# Patient Record
Sex: MALE | Race: WHITE | NOT HISPANIC OR LATINO | Employment: UNEMPLOYED | URBAN - METROPOLITAN AREA
[De-identification: names, ages, dates, MRNs, and addresses within clinical notes are randomized per-mention and may not be internally consistent; named-entity substitution may affect disease eponyms.]

---

## 2018-02-27 ENCOUNTER — OFFICE VISIT CONVERTED (OUTPATIENT)
Dept: FAMILY MEDICINE CLINIC | Facility: CLINIC | Age: 33
End: 2018-02-27
Attending: NURSE PRACTITIONER

## 2018-08-30 ENCOUNTER — OFFICE VISIT CONVERTED (OUTPATIENT)
Dept: FAMILY MEDICINE CLINIC | Facility: CLINIC | Age: 33
End: 2018-08-30
Attending: NURSE PRACTITIONER

## 2019-03-05 ENCOUNTER — CONVERSION ENCOUNTER (OUTPATIENT)
Dept: FAMILY MEDICINE CLINIC | Facility: CLINIC | Age: 34
End: 2019-03-05

## 2019-03-05 ENCOUNTER — OFFICE VISIT CONVERTED (OUTPATIENT)
Dept: FAMILY MEDICINE CLINIC | Facility: CLINIC | Age: 34
End: 2019-03-05
Attending: NURSE PRACTITIONER

## 2019-09-17 ENCOUNTER — OFFICE VISIT CONVERTED (OUTPATIENT)
Dept: FAMILY MEDICINE CLINIC | Facility: CLINIC | Age: 34
End: 2019-09-17
Attending: NURSE PRACTITIONER

## 2019-11-25 ENCOUNTER — HOSPITAL ENCOUNTER (OUTPATIENT)
Dept: URGENT CARE | Facility: CLINIC | Age: 34
Discharge: HOME | End: 2019-11-25

## 2020-06-03 ENCOUNTER — OFFICE VISIT CONVERTED (OUTPATIENT)
Dept: FAMILY MEDICINE CLINIC | Facility: CLINIC | Age: 35
End: 2020-06-03
Attending: NURSE PRACTITIONER

## 2020-06-03 ENCOUNTER — HOSPITAL ENCOUNTER (OUTPATIENT)
Dept: FAMILY MEDICINE CLINIC | Facility: CLINIC | Age: 35
Discharge: HOME | End: 2020-06-03
Attending: NURSE PRACTITIONER

## 2020-06-03 LAB
ALBUMIN SERPL-MCNC: 4.6 G/DL (ref 3.5–5)
ALBUMIN/GLOB SERPL: 1.8 {RATIO} (ref 1.4–2.6)
ALP SERPL-CCNC: 55 U/L (ref 53–128)
ALT SERPL-CCNC: 16 U/L (ref 10–40)
ANION GAP SERPL CALC-SCNC: 14 MMOL/L (ref 8–19)
APPEARANCE UR: CLEAR
AST SERPL-CCNC: 25 U/L (ref 15–50)
BASOPHILS # BLD AUTO: 0.11 10*3/UL (ref 0–0.2)
BASOPHILS NFR BLD AUTO: 1.5 % (ref 0–3)
BILIRUB SERPL-MCNC: 0.25 MG/DL (ref 0.2–1.3)
BILIRUB UR QL: NEGATIVE
BUN SERPL-MCNC: 10 MG/DL (ref 5–25)
BUN/CREAT SERPL: 9 {RATIO} (ref 6–20)
CALCIUM SERPL-MCNC: 9.6 MG/DL (ref 8.7–10.4)
CHLORIDE SERPL-SCNC: 105 MMOL/L (ref 99–111)
CHOLEST SERPL-MCNC: 155 MG/DL (ref 107–200)
CHOLEST/HDLC SERPL: 3.9 {RATIO} (ref 3–6)
COLOR UR: YELLOW
CONV ABS IMM GRAN: 0.01 10*3/UL (ref 0–0.2)
CONV CO2: 25 MMOL/L (ref 22–32)
CONV COLLECTION SOURCE (UA): NORMAL
CONV IMMATURE GRAN: 0.1 % (ref 0–1.8)
CONV TOTAL PROTEIN: 7.2 G/DL (ref 6.3–8.2)
CONV UROBILINOGEN IN URINE BY AUTOMATED TEST STRIP: 0.2 {EHRLICHU}/DL (ref 0.1–1)
CREAT UR-MCNC: 1.12 MG/DL (ref 0.7–1.2)
DEPRECATED RDW RBC AUTO: 41.1 FL (ref 35.1–43.9)
EOSINOPHIL # BLD AUTO: 0.28 10*3/UL (ref 0–0.7)
EOSINOPHIL # BLD AUTO: 3.9 % (ref 0–7)
ERYTHROCYTE [DISTWIDTH] IN BLOOD BY AUTOMATED COUNT: 11.9 % (ref 11.6–14.4)
GFR SERPLBLD BASED ON 1.73 SQ M-ARVRAT: >60 ML/MIN/{1.73_M2}
GLOBULIN UR ELPH-MCNC: 2.6 G/DL (ref 2–3.5)
GLUCOSE SERPL-MCNC: 83 MG/DL (ref 70–99)
GLUCOSE UR QL: NEGATIVE MG/DL
HCT VFR BLD AUTO: 43.7 % (ref 42–52)
HDLC SERPL-MCNC: 40 MG/DL (ref 40–60)
HGB BLD-MCNC: 14.6 G/DL (ref 14–18)
HGB UR QL STRIP: NEGATIVE
KETONES UR QL STRIP: NEGATIVE MG/DL
LDLC SERPL CALC-MCNC: 97 MG/DL (ref 70–100)
LEUKOCYTE ESTERASE UR QL STRIP: NEGATIVE
LYMPHOCYTES # BLD AUTO: 1.63 10*3/UL (ref 1–5)
LYMPHOCYTES NFR BLD AUTO: 22.6 % (ref 20–45)
MCH RBC QN AUTO: 31.3 PG (ref 27–31)
MCHC RBC AUTO-ENTMCNC: 33.4 G/DL (ref 33–37)
MCV RBC AUTO: 93.6 FL (ref 80–96)
MONOCYTES # BLD AUTO: 0.58 10*3/UL (ref 0.2–1.2)
MONOCYTES NFR BLD AUTO: 8 % (ref 3–10)
NEUTROPHILS # BLD AUTO: 4.6 10*3/UL (ref 2–8)
NEUTROPHILS NFR BLD AUTO: 63.9 % (ref 30–85)
NITRITE UR QL STRIP: NEGATIVE
NRBC CBCN: 0 % (ref 0–0.7)
OSMOLALITY SERPL CALC.SUM OF ELEC: 288 MOSM/KG (ref 273–304)
PH UR STRIP.AUTO: 6.5 [PH] (ref 5–8)
PLATELET # BLD AUTO: 283 10*3/UL (ref 130–400)
PMV BLD AUTO: 10 FL (ref 9.4–12.4)
POTASSIUM SERPL-SCNC: 4.3 MMOL/L (ref 3.5–5.3)
PROT UR QL: NEGATIVE MG/DL
RBC # BLD AUTO: 4.67 10*6/UL (ref 4.7–6.1)
SODIUM SERPL-SCNC: 140 MMOL/L (ref 135–147)
SP GR UR: 1.01 (ref 1–1.03)
TRIGL SERPL-MCNC: 92 MG/DL (ref 40–150)
VLDLC SERPL-MCNC: 18 MG/DL (ref 5–37)
WBC # BLD AUTO: 7.21 10*3/UL (ref 4.8–10.8)

## 2020-11-25 ENCOUNTER — OFFICE VISIT CONVERTED (OUTPATIENT)
Dept: FAMILY MEDICINE CLINIC | Facility: CLINIC | Age: 35
End: 2020-11-25
Attending: FAMILY MEDICINE

## 2020-11-25 ENCOUNTER — HOSPITAL ENCOUNTER (OUTPATIENT)
Dept: FAMILY MEDICINE CLINIC | Facility: CLINIC | Age: 35
Discharge: HOME | End: 2020-11-25
Attending: FAMILY MEDICINE

## 2020-11-25 LAB
ALBUMIN SERPL-MCNC: 4.5 G/DL (ref 3.5–5)
ALBUMIN/GLOB SERPL: 1.5 {RATIO} (ref 1.4–2.6)
ALP SERPL-CCNC: 62 U/L (ref 53–128)
ALT SERPL-CCNC: 18 U/L (ref 10–40)
ANION GAP SERPL CALC-SCNC: 17 MMOL/L (ref 8–19)
AST SERPL-CCNC: 27 U/L (ref 15–50)
BASOPHILS # BLD AUTO: 0.11 10*3/UL (ref 0–0.2)
BASOPHILS NFR BLD AUTO: 1.3 % (ref 0–3)
BILIRUB SERPL-MCNC: 0.24 MG/DL (ref 0.2–1.3)
BUN SERPL-MCNC: 12 MG/DL (ref 5–25)
BUN/CREAT SERPL: 9 {RATIO} (ref 6–20)
CALCIUM SERPL-MCNC: 10 MG/DL (ref 8.7–10.4)
CHLORIDE SERPL-SCNC: 103 MMOL/L (ref 99–111)
CHOLEST SERPL-MCNC: 169 MG/DL (ref 107–200)
CHOLEST/HDLC SERPL: 4.2 {RATIO} (ref 3–6)
CONV ABS IMM GRAN: 0.01 10*3/UL (ref 0–0.2)
CONV CO2: 25 MMOL/L (ref 22–32)
CONV IMMATURE GRAN: 0.1 % (ref 0–1.8)
CONV TOTAL PROTEIN: 7.5 G/DL (ref 6.3–8.2)
CREAT UR-MCNC: 1.27 MG/DL (ref 0.7–1.2)
DEPRECATED RDW RBC AUTO: 39.9 FL (ref 35.1–43.9)
EOSINOPHIL # BLD AUTO: 0.24 10*3/UL (ref 0–0.7)
EOSINOPHIL # BLD AUTO: 2.8 % (ref 0–7)
ERYTHROCYTE [DISTWIDTH] IN BLOOD BY AUTOMATED COUNT: 11.8 % (ref 11.6–14.4)
GFR SERPLBLD BASED ON 1.73 SQ M-ARVRAT: >60 ML/MIN/{1.73_M2}
GLOBULIN UR ELPH-MCNC: 3 G/DL (ref 2–3.5)
GLUCOSE SERPL-MCNC: 84 MG/DL (ref 70–99)
HCT VFR BLD AUTO: 42 % (ref 42–52)
HDLC SERPL-MCNC: 40 MG/DL (ref 40–60)
HGB BLD-MCNC: 14.3 G/DL (ref 14–18)
LDLC SERPL CALC-MCNC: 105 MG/DL (ref 70–100)
LYMPHOCYTES # BLD AUTO: 2.19 10*3/UL (ref 1–5)
LYMPHOCYTES NFR BLD AUTO: 25.6 % (ref 20–45)
MCH RBC QN AUTO: 31.6 PG (ref 27–31)
MCHC RBC AUTO-ENTMCNC: 34 G/DL (ref 33–37)
MCV RBC AUTO: 92.9 FL (ref 80–96)
MONOCYTES # BLD AUTO: 0.82 10*3/UL (ref 0.2–1.2)
MONOCYTES NFR BLD AUTO: 9.6 % (ref 3–10)
NEUTROPHILS # BLD AUTO: 5.19 10*3/UL (ref 2–8)
NEUTROPHILS NFR BLD AUTO: 60.6 % (ref 30–85)
NRBC CBCN: 0 % (ref 0–0.7)
OSMOLALITY SERPL CALC.SUM OF ELEC: 289 MOSM/KG (ref 273–304)
PLATELET # BLD AUTO: 286 10*3/UL (ref 130–400)
PMV BLD AUTO: 10.2 FL (ref 9.4–12.4)
POTASSIUM SERPL-SCNC: 4.6 MMOL/L (ref 3.5–5.3)
RBC # BLD AUTO: 4.52 10*6/UL (ref 4.7–6.1)
SODIUM SERPL-SCNC: 140 MMOL/L (ref 135–147)
T4 FREE SERPL-MCNC: 1.1 NG/DL (ref 0.9–1.8)
TRIGL SERPL-MCNC: 121 MG/DL (ref 40–150)
TROPONIN T SERPL-MCNC: <0.01 NG/ML (ref 0–0.1)
TSH SERPL-ACNC: 1.48 M[IU]/L (ref 0.27–4.2)
VLDLC SERPL-MCNC: 24 MG/DL (ref 5–37)
WBC # BLD AUTO: 8.56 10*3/UL (ref 4.8–10.8)

## 2020-12-02 ENCOUNTER — OFFICE VISIT CONVERTED (OUTPATIENT)
Dept: FAMILY MEDICINE CLINIC | Facility: CLINIC | Age: 35
End: 2020-12-02
Attending: FAMILY MEDICINE

## 2020-12-02 ENCOUNTER — HOSPITAL ENCOUNTER (OUTPATIENT)
Dept: FAMILY MEDICINE CLINIC | Facility: CLINIC | Age: 35
Discharge: HOME | End: 2020-12-02
Attending: FAMILY MEDICINE

## 2020-12-02 LAB
ANION GAP SERPL CALC-SCNC: 13 MMOL/L (ref 8–19)
BUN SERPL-MCNC: 22 MG/DL (ref 5–25)
BUN/CREAT SERPL: 15 {RATIO} (ref 6–20)
CALCIUM SERPL-MCNC: 9.7 MG/DL (ref 8.7–10.4)
CHLORIDE SERPL-SCNC: 101 MMOL/L (ref 99–111)
CONV CO2: 29 MMOL/L (ref 22–32)
CREAT UR-MCNC: 1.47 MG/DL (ref 0.7–1.2)
GFR SERPLBLD BASED ON 1.73 SQ M-ARVRAT: >60 ML/MIN/{1.73_M2}
GLUCOSE SERPL-MCNC: 75 MG/DL (ref 70–99)
OSMOLALITY SERPL CALC.SUM OF ELEC: 290 MOSM/KG (ref 273–304)
POTASSIUM SERPL-SCNC: 3.8 MMOL/L (ref 3.5–5.3)
SODIUM SERPL-SCNC: 139 MMOL/L (ref 135–147)

## 2021-03-12 ENCOUNTER — OFFICE VISIT CONVERTED (OUTPATIENT)
Dept: FAMILY MEDICINE CLINIC | Facility: CLINIC | Age: 36
End: 2021-03-12
Attending: FAMILY MEDICINE

## 2021-03-12 ENCOUNTER — HOSPITAL ENCOUNTER (OUTPATIENT)
Dept: FAMILY MEDICINE CLINIC | Facility: CLINIC | Age: 36
Discharge: HOME | End: 2021-03-12
Attending: FAMILY MEDICINE

## 2021-03-12 LAB
ALBUMIN SERPL-MCNC: 4.7 G/DL (ref 3.5–5)
ALBUMIN/GLOB SERPL: 1.5 {RATIO} (ref 1.4–2.6)
ALP SERPL-CCNC: 58 U/L (ref 53–128)
ALT SERPL-CCNC: 14 U/L (ref 10–40)
ANION GAP SERPL CALC-SCNC: 20 MMOL/L (ref 8–19)
AST SERPL-CCNC: 17 U/L (ref 15–50)
BASOPHILS # BLD AUTO: 0.08 10*3/UL (ref 0–0.2)
BASOPHILS NFR BLD AUTO: 1.1 % (ref 0–3)
BILIRUB SERPL-MCNC: 0.22 MG/DL (ref 0.2–1.3)
BUN SERPL-MCNC: 62 MG/DL (ref 5–25)
BUN/CREAT SERPL: 14 {RATIO} (ref 6–20)
CALCIUM SERPL-MCNC: 9.6 MG/DL (ref 8.7–10.4)
CHLORIDE SERPL-SCNC: 98 MMOL/L (ref 99–111)
CONV ABS IMM GRAN: 0.03 10*3/UL (ref 0–0.2)
CONV CO2: 18 MMOL/L (ref 22–32)
CONV IMMATURE GRAN: 0.4 % (ref 0–1.8)
CONV TOTAL PROTEIN: 7.8 G/DL (ref 6.3–8.2)
CREAT UR-MCNC: 4.28 MG/DL (ref 0.7–1.2)
DEPRECATED RDW RBC AUTO: 36.8 FL (ref 35.1–43.9)
EOSINOPHIL # BLD AUTO: 0.29 10*3/UL (ref 0–0.7)
EOSINOPHIL # BLD AUTO: 3.9 % (ref 0–7)
ERYTHROCYTE [DISTWIDTH] IN BLOOD BY AUTOMATED COUNT: 11.6 % (ref 11.6–14.4)
GFR SERPLBLD BASED ON 1.73 SQ M-ARVRAT: 16 ML/MIN/{1.73_M2}
GLOBULIN UR ELPH-MCNC: 3.1 G/DL (ref 2–3.5)
GLUCOSE SERPL-MCNC: 97 MG/DL (ref 70–99)
HCT VFR BLD AUTO: 30.2 % (ref 42–52)
HGB BLD-MCNC: 10.4 G/DL (ref 14–18)
LYMPHOCYTES # BLD AUTO: 1.78 10*3/UL (ref 1–5)
LYMPHOCYTES NFR BLD AUTO: 23.7 % (ref 20–45)
MCH RBC QN AUTO: 30 PG (ref 27–31)
MCHC RBC AUTO-ENTMCNC: 34.4 G/DL (ref 33–37)
MCV RBC AUTO: 87 FL (ref 80–96)
MONOCYTES # BLD AUTO: 0.64 10*3/UL (ref 0.2–1.2)
MONOCYTES NFR BLD AUTO: 8.5 % (ref 3–10)
NEUTROPHILS # BLD AUTO: 4.69 10*3/UL (ref 2–8)
NEUTROPHILS NFR BLD AUTO: 62.4 % (ref 30–85)
NRBC CBCN: 0 % (ref 0–0.7)
OSMOLALITY SERPL CALC.SUM OF ELEC: 290 MOSM/KG (ref 273–304)
PLATELET # BLD AUTO: 372 10*3/UL (ref 130–400)
PMV BLD AUTO: 9.4 FL (ref 9.4–12.4)
POTASSIUM SERPL-SCNC: 5 MMOL/L (ref 3.5–5.3)
RBC # BLD AUTO: 3.47 10*6/UL (ref 4.7–6.1)
SODIUM SERPL-SCNC: 131 MMOL/L (ref 135–147)
WBC # BLD AUTO: 7.51 10*3/UL (ref 4.8–10.8)

## 2021-03-15 ENCOUNTER — HOSPITAL ENCOUNTER (OUTPATIENT)
Dept: OTHER | Facility: HOSPITAL | Age: 36
Discharge: HOME | End: 2021-03-15
Attending: UROLOGY

## 2021-03-18 ENCOUNTER — OFFICE VISIT CONVERTED (OUTPATIENT)
Dept: UROLOGY | Facility: CLINIC | Age: 36
End: 2021-03-18
Attending: UROLOGY

## 2021-03-18 ENCOUNTER — CONVERSION ENCOUNTER (OUTPATIENT)
Dept: SURGERY | Facility: CLINIC | Age: 36
End: 2021-03-18

## 2021-03-19 ENCOUNTER — HOSPITAL ENCOUNTER (OUTPATIENT)
Dept: PERIOP | Facility: HOSPITAL | Age: 36
Setting detail: HOSPITAL OUTPATIENT SURGERY
Discharge: HOME | End: 2021-03-19
Attending: UROLOGY

## 2021-03-19 LAB
ANION GAP SERPL CALC-SCNC: 16 MMOL/L (ref 8–19)
BASOPHILS # BLD AUTO: 0.12 10*3/UL (ref 0–0.2)
BASOPHILS NFR BLD AUTO: 1.6 % (ref 0–3)
BUN SERPL-MCNC: 46 MG/DL (ref 5–25)
BUN/CREAT SERPL: 14 {RATIO} (ref 6–20)
CALCIUM SERPL-MCNC: 9.7 MG/DL (ref 8.7–10.4)
CHLORIDE SERPL-SCNC: 103 MMOL/L (ref 99–111)
CONV ABS IMM GRAN: 0.01 10*3/UL (ref 0–0.2)
CONV CO2: 22 MMOL/L (ref 22–32)
CONV IMMATURE GRAN: 0.1 % (ref 0–1.8)
CREAT UR-MCNC: 3.3 MG/DL (ref 0.7–1.2)
DEPRECATED RDW RBC AUTO: 38 FL (ref 35.1–43.9)
EOSINOPHIL # BLD AUTO: 0.24 10*3/UL (ref 0–0.7)
EOSINOPHIL # BLD AUTO: 3.3 % (ref 0–7)
ERYTHROCYTE [DISTWIDTH] IN BLOOD BY AUTOMATED COUNT: 11.8 % (ref 11.6–14.4)
GFR SERPLBLD BASED ON 1.73 SQ M-ARVRAT: 23 ML/MIN/{1.73_M2}
GLUCOSE SERPL-MCNC: 98 MG/DL (ref 70–99)
HCT VFR BLD AUTO: 25.9 % (ref 42–52)
HGB BLD-MCNC: 9.1 G/DL (ref 14–18)
LYMPHOCYTES # BLD AUTO: 1.76 10*3/UL (ref 1–5)
LYMPHOCYTES NFR BLD AUTO: 24.1 % (ref 20–45)
MCH RBC QN AUTO: 30.8 PG (ref 27–31)
MCHC RBC AUTO-ENTMCNC: 35.1 G/DL (ref 33–37)
MCV RBC AUTO: 87.8 FL (ref 80–96)
MONOCYTES # BLD AUTO: 0.57 10*3/UL (ref 0.2–1.2)
MONOCYTES NFR BLD AUTO: 7.8 % (ref 3–10)
NEUTROPHILS # BLD AUTO: 4.61 10*3/UL (ref 2–8)
NEUTROPHILS NFR BLD AUTO: 63.1 % (ref 30–85)
NRBC CBCN: 0 % (ref 0–0.7)
OSMOLALITY SERPL CALC.SUM OF ELEC: 294 MOSM/KG (ref 273–304)
PLATELET # BLD AUTO: 326 10*3/UL (ref 130–400)
PMV BLD AUTO: 9.2 FL (ref 9.4–12.4)
POTASSIUM SERPL-SCNC: 4.7 MMOL/L (ref 3.5–5.3)
RBC # BLD AUTO: 2.95 10*6/UL (ref 4.7–6.1)
SODIUM SERPL-SCNC: 136 MMOL/L (ref 135–147)
WBC # BLD AUTO: 7.31 10*3/UL (ref 4.8–10.8)

## 2021-04-01 ENCOUNTER — HOSPITAL ENCOUNTER (OUTPATIENT)
Dept: OTHER | Facility: HOSPITAL | Age: 36
Discharge: HOME | End: 2021-04-01
Attending: UROLOGY

## 2021-04-01 ENCOUNTER — OFFICE VISIT CONVERTED (OUTPATIENT)
Dept: UROLOGY | Facility: CLINIC | Age: 36
End: 2021-04-01
Attending: UROLOGY

## 2021-04-01 LAB
ALBUMIN SERPL-MCNC: 4.2 G/DL (ref 3.5–5)
ALBUMIN/GLOB SERPL: 1.4 {RATIO} (ref 1.4–2.6)
ALP SERPL-CCNC: 71 U/L (ref 53–128)
ALT SERPL-CCNC: 25 U/L (ref 10–40)
ANION GAP SERPL CALC-SCNC: 16 MMOL/L (ref 8–19)
AST SERPL-CCNC: 23 U/L (ref 15–50)
BILIRUB SERPL-MCNC: <0.15 MG/DL (ref 0.2–1.3)
BUN SERPL-MCNC: 25 MG/DL (ref 5–25)
BUN/CREAT SERPL: 8 {RATIO} (ref 6–20)
CALCIUM SERPL-MCNC: 9.7 MG/DL (ref 8.7–10.4)
CHLORIDE SERPL-SCNC: 104 MMOL/L (ref 99–111)
CONV CO2: 25 MMOL/L (ref 22–32)
CONV TOTAL PROTEIN: 7.3 G/DL (ref 6.3–8.2)
CREAT UR-MCNC: 3.05 MG/DL (ref 0.7–1.2)
GFR SERPLBLD BASED ON 1.73 SQ M-ARVRAT: 25 ML/MIN/{1.73_M2}
GLOBULIN UR ELPH-MCNC: 3.1 G/DL (ref 2–3.5)
GLUCOSE SERPL-MCNC: 98 MG/DL (ref 70–99)
OSMOLALITY SERPL CALC.SUM OF ELEC: 294 MOSM/KG (ref 273–304)
POTASSIUM SERPL-SCNC: 4.5 MMOL/L (ref 3.5–5.3)
SODIUM SERPL-SCNC: 140 MMOL/L (ref 135–147)

## 2021-04-14 ENCOUNTER — CONVERSION ENCOUNTER (OUTPATIENT)
Dept: SURGERY | Facility: CLINIC | Age: 36
End: 2021-04-14

## 2021-05-07 NOTE — PROGRESS NOTES
Progress Note      Patient Name: Rick Weems   Patient ID: [id number]   Sex: Male   YOB: 1985        Visit Date: March 12, 2021    Provider: Garfield Viveros MD   Location: [facility]   Location Address: [address] [place] [address] [place]  [address]   Location Phone: [phone]          Chief Complaint  · Follow up office visit within 7 calender days of discharge from inpatient status (high complexity).      History Of Present Illness  FOLLOW UP OFFICE VISIT WITHIN 7 CALENDER DAYS OF INPATIENT STATUS (SEVERE COMPLEXITY)  Rick Weems presents to office for follow up post discharge from inpatient status within 7 calender days. Patient was contacted within 2 business days via phone conversation. Documentation of that phone contact is present in the patient's electronic chart. Patient was admitted to an inpatient faciliity on 03/05/2021 and discharged on 03/08/2021 due to: ACUTE KIDNEY FAILURE   Admitting MD: JOEY TREVINO   His discharge summary has been reviewed and placed in the patient's electronic chart.   Patient's problem list is: Anxiety, Hypertension, Benign Essential, and Seasonal allergies   Patient's medication list has been updated/reconcilled from discharge summary. Medication list is: acetaminophen 500 mg oral tablet, Florastor 250 mg oral capsule, hydrocodone-acetaminophen 5-325 mg oral tablet, levofloxacin 500 mg oral tablet, Miralax 17 gram/dose oral powder, and tamsulosin 0.4 mg oral capsule      pt says that he is doing better since being in hospital for prostatitis.  Pt will be seeing urology and nephrology in 1 week.  Pt will get CT scan next week.  Pt still has nails.  HGM on discharge was 8.7.  Pt has had no further symptoms.   Rick Weems is a 35 year old /White male who presents for evaluation and treatment of:       Past Medical History  Disease Name Date Onset Notes   Anxiety --  --    Hypertension, Benign Essential --  --    Seasonal  allergies --  --          Past Surgical History  Procedure Name Date Notes   *Denies any surgical procedures --  --          Medication List  Name Date Started Instructions   acetaminophen 500 mg oral tablet 03/08/2021 take 2 tablets (1,000 mg) by oral route every 8 hours as needed   Florastor 250 mg oral capsule 03/08/2021 take 1 capsule by oral route 2 times a day   hydrocodone-acetaminophen 5-325 mg oral tablet 03/08/2021 take 1 tablet by oral route every 6 hours as needed for pain   levofloxacin 500 mg oral tablet 03/10/2021 take 1 tablet (500 mg) by oral route every other day starting on 03/10   Miralax 17 gram/dose oral powder 03/10/2021 take 17 gram mixed with 8 oz. water, juice, soda, coffee or tea by oral route once daily   tamsulosin 0.4 mg oral capsule 03/08/2021 take 1 capsule (0.4 mg) by oral route once daily 1/2 hour following the same meal each day         Allergy List  Allergen Name Date Reaction Notes   NO KNOWN DRUG ALLERGIES --  --  --          Family Medical History  Disease Name Relative/Age Notes   Chronic Obstructive Pulmonary Disease Father/   Father   DM Type II Father/   Father   Hypertension Father/  Mother/   Father; Mother   Cerebrovascular Accident (CVA) Grandfather (maternal)/   Grandfather (maternal)         Social History  Finding Status Start/Stop Quantity Notes   Alcohol Never --/-- --  never drinks alcohol   Exercises regularly --  --/-- --  3-4 times per week   Recreational Drug Use Never --/-- --  never used   Second hand smoke exposure Unknown --/-- --  yes   Tobacco Former --/-- 0.5 PPD former smoker, smokes less than 1 pack per day, for 5 years, never uses other tobacco products   Uses seatbelts --  --/-- --  yes         Immunizations  NameDate Admin Mfg Trade Name Lot Number Route Inj VIS Given VIS Publication   Ucwzoupvl73/27/2018 SKB FLUVIRIN [id number] IM RD 02/27/2018 08/07/2015   Comments: [id number]         Review of Systems  · Constitutional  o Denies  o : fever,  "weight gain, weight loss, malaise/fatigue  · Cardiovascular  o Denies  o : palpitation, chest pain, claudication, lower extremity edema  · Respiratory  o Denies  o : shortness of breath, hemoptysis, wheezing, productive cough  · Gastrointestinal  o Denies  o : nausea, vomiting, diarrhea, constipation, abdominal pain  · Neurologic  o Denies  o : unsteady gait, weakness, dizziness, H/A      Vitals  Date Time BP Position Site L\R Cuff Size HR RR TEMP (F) WT  HT  BMI kg/m2 BSA m2 O2 Sat FR L/min FiO2 HC       03/12/2021 12:47 /90 Sitting    100 - R  97.5 146lbs 16oz 5'  9\" 21.71 1.8 92 %  21%          Physical Examination  · Constitutional  o Appearance  o : well developed, well-nourished, in no acute distress  · Respiratory  o Respiratory Effort  o : breathing unlabored  o Auscultation of Lungs  o : clear to ascultation  · Cardiovascular  o Heart  o :   § Auscultation of Heart  § : regular rate and rhythm  o Peripheral Vascular System  o :   § Extremities  § : no edema  · Gastrointestinal  o Abdomen  o : soft, non-tender, non-distended, + bowel sounds, no hepatosplenomegaly, no masses palpated  · Musculoskeletal  o General  o :   § General Musculoskeletal  § : No joint swelling or deformity., Muscle tone, strength, and development grossly normal.  · Neurologic  o Gait and Station  o :   § Gait Screening  § : normal gait  · Psychiatric  o Mood and Affect  o : mood normal, affect appropriate          Assessment  · Anemia     285.9/D64.9  · Essential hypertension     401.9/I10      Plan  · Orders  o Discharge medications reconciled with the current medication list (1111F) - - 03/12/2021  o CBC with Auto Diff [facility] ([id number]) - 285.9/D64.9, 401.9/I10 - 03/12/2021  o CMP [facility] ([id number]) - 285.9/D64.9, 401.9/I10 - 03/12/2021  o ACO-39: Current medications updated and reviewed (1159F, ) - - 03/12/2021  · Medications  o Medications have been Reconciled  o Transition of Care or Provider Policy  · Instructions  o Patient " discharge summary has been reviewed and placed in patient's electronic medical record.  o Patient received a phone call from my office within 2 business days of discharge from inpatient status, and documented within the patient's chart.  o Also patient was seen (face to face) for follow up evaluation within 7 calender days of discharge from inpatient status for a high complexity issue.  o Patient was educated on their diagnosis, treatment and any medication changes while being evaluated today.  o Patient was educated/instructed on their diagnosis, treatment and medications prior to discharge from the clinic today.            Electronically Signed by: Garfield Viveros MD -Author on March 12, 2021 01:00:14 PM

## 2021-05-07 NOTE — PROGRESS NOTES
Progress Note      Patient Name: Rick Weems   Patient ID: [id number]   Sex: Male   YOB: 1985        Visit Date: February 27, 2018    Provider: KWAME Carrasco   Location: [facility]   Location Address: [address] [place] [address]  [id number]   Location Phone: [phone]          Chief Complaint     refill       History Of Present Illness  Rick Weems is a 32 year old /White male who presents for evaluation and treatment of:      HTN: stable - checks his BP at home and usually low 120's/ 80 - tries to follow a low salt diet, eats whatever he wants.     Has anxiety and his BP will go up slightly when in stressful/new situations.       Past Medical History  Disease Name Date Onset Notes   Anxiety --  --    Hypertension, Benign Essential --  --    Seasonal allergies --  --          Past Surgical History  Procedure Name Date Notes   *Denies any surgical procedures --  --          Medication List  Name Date Started Instructions   metoprolol succinate 50 mg oral tablet extended release 24 hr 11/27/2017 TAKE ONE TABLET BY MOUTH DAILY         Allergy List  Allergen Name Date Reaction Notes   NO KNOWN DRUG ALLERGIES --  --  --          Family Medical History  Disease Name Relative/Age Notes   Cerebrovascular Accident (CVA) / Grandfather (maternal)    Grandfather (maternal)/    Chronic Obstructive Pulmonary Disease / Father    Father/    DM Type II / Father    Father/    Hypertension / Father; Mother    Father/     Mother/          Social History  Finding Status Start/Stop Quantity Notes   Alcohol Never --/-- --  never drinks alcohol   Exercises regularly --  --/-- --  3-4 times per week   Recreational Drug Use Never --/-- --  never used   Second hand smoke exposure Unknown --/-- --  yes   Tobacco Former --/-- 0.5 PPD former smoker, smokes less than 1 pack per day, for 5 years, never uses other tobacco products   Uses seatbelts --  --/-- --  yes         Review  "of Systems  · Constitutional  o Denies  o : headache  · Cardiovascular  o Denies  o : chest pain, palpitations  · Respiratory  o Denies  o : shortness of breath, wheezing, cough      Vitals  Date Time BP Position Site L\R Cuff Size HR RR TEMP(F) WT  HT  BMI kg/m2 BSA m2 O2 Sat        02/27/2018 01:26 /80 Sitting    73 - R  97.6 155lbs 6oz 5'  10\" 22.29 1.87 99 %           Physical Examination  · Constitutional  o Appearance  o : well developed, well-nourished, in no acute distress  · Eyes  o Conjunctivae  o : conjunctivae normal  o Pupils and Irises  o : pupils equal and round, pupils reactive to light bilaterally  · Neck  o Inspection/Palpation  o : supple  o Thyroid  o : no thyromegaly  · Respiratory  o Respiratory Effort  o : breathing unlabored  o Auscultation of Lungs  o : clear to ascultation  · Cardiovascular  o Heart  o :   § Auscultation of Heart  § : regular rate and rhythm  o Peripheral Vascular System  o :   § Extremities  § : no edema  · Lymphatic  o Neck  o : no lymphadenopathy present  · Musculoskeletal  o General  o :   § General Musculoskeletal  § : Muscle tone, strength, and development grossly normal.  · Skin and Subcutaneous Tissue  o General Inspection  o : no lesions present, no areas of discoloration, skin turgor normal, texture normal  · Neurologic  o Gait and Station  o :   § Gait Screening  § : normal gait  · Psychiatric  o Mood and Affect  o : anxiety, affect appropriate          Assessment  · Need for influenza vaccination     V04.81/Z23      Plan  · Orders  o Flucelvax Quadrivalent Syringes [facility] ([id number]) - V04.81/Z23 - 02/27/2018   Vaccine - Influenza; Dose: 0.5; Site: Right Deltoid; Route: Intramuscular; Date: 02/27/2018 13:57:00; Exp: 05/28/2018; Lot: [id number]; Mfg: Quantified Skin; TradeName: Fluarix Quadrivalent; Administered By: Bety Munson MA; Comment: [id number]  o ACO-39: Current medications updated and reviewed () - - 02/27/2018  o ACO-18: Negative screen for " clinical depression using a standardized tool () - - 02/27/2018   verbal PHQ-2  o Influenza immunization was ordered or administered () - V04.81/Z23 - 02/27/2018  · Medications  o metoprolol succinate 50 mg oral tablet extended release 24 hr   SIG: TAKE ONE TABLET BY MOUTH DAILY   DISP: (30) Tablet with 5 refills  Adjusted on 02/27/2018     · Instructions  o Take all medications as prescribed/directed.  o Patient was educated/instructed on their diagnosis, treatment and medications prior to discharge from the clinic today.  o Follow up for next visit fasting for labs.   · Disposition  o Follow up as needed.            Electronically Signed by: KWAME Carrasco -Author on February 27, 2018 02:17:44 PM

## 2021-05-07 NOTE — PROGRESS NOTES
Progress Note      Patient Name: Rick Weems   Patient ID: [id number]   Sex: Male   YOB: 1985        Visit Date: November 25, 2020    Provider: Garfield Viveros MD   [facility]   Location Address: [address] [place] [address] [address] [place]  [address]   Location Phone: [phone]          Chief Complaint     Blood pressure staying elevated.       History Of Present Illness  Rick Weems is a 35 year old /White male who presents for evaluation and treatment of:      pt has had elevating BP over last few weeks- no headaches, no vision changes  HTN- uncontrolled  pt has no h/o of arrhythmia of palpitations or fast heartbeat- his previous doctor  pt had chest pains that lasted about a minute last night- no chest pains today- chest pains were sharp in center of chest    xrays:NAD  EKG:NSR, no ST or T wave changes       Past Medical History  Disease Name Date Onset Notes   Anxiety --  --    Hypertension, Benign Essential --  --    Seasonal allergies --  --          Past Surgical History  Procedure Name Date Notes   *Denies any surgical procedures --  --          Allergy List  Allergen Name Date Reaction Notes   NO KNOWN DRUG ALLERGIES --  --  --          Family Medical History  Disease Name Relative/Age Notes   Chronic Obstructive Pulmonary Disease Father/   Father   DM Type II Father/   Father   Hypertension Father/  Mother/   Father; Mother   Cerebrovascular Accident (CVA) Grandfather (maternal)/   Grandfather (maternal)         Social History  Finding Status Start/Stop Quantity Notes   Alcohol Never --/-- --  never drinks alcohol   Exercises regularly --  --/-- --  3-4 times per week   Recreational Drug Use Never --/-- --  never used   Second hand smoke exposure Unknown --/-- --  yes   Tobacco Former --/-- 0.5 PPD former smoker, smokes less than 1 pack per day, for 5 years, never uses other tobacco products   Uses seatbelts --  --/-- --  yes          Immunizations  NameDate Admin Mfg Trade Name Lot Number Route Inj VIS Given VIS Publication   [id number]/27/2018 SKB FLUVIRIN [id number] IM RD 02/27/2018 08/07/2015   Comments: [id number]         Review of Systems  · Constitutional  o Denies  o : fatigue, fever  · Eyes  o Denies  o : double vision, impaired vision, blurred vision, changes in vision  · HENT  o Denies  o : headaches  · Cardiovascular  o Admits  o : chest pain  o Denies  o : syncope, swelling (feet, ankles, hands), palpitations  · Respiratory  o Denies  o : shortness of breath, cough  · Gastrointestinal  o Denies  o : nausea, vomiting, diarrhea  · Psychiatric  o Denies  o : anxiety, depression      Vitals  Date Time BP Position Site L\R Cuff Size HR RR TEMP (F) WT  HT  BMI kg/m2 BSA m2 O2 Sat FR L/min FiO2        11/25/2020 12:40 /100 Sitting    88 - R  98 161lbs 0oz    99 %            Physical Examination  · Constitutional  o Appearance  o : well developed, well-nourished, in no acute distress  · Respiratory  o Respiratory Effort  o : breathing unlabored  o Auscultation of Lungs  o : clear to ascultation  · Cardiovascular  o Heart  o :   § Auscultation of Heart  § : regular rate and rhythm  o Peripheral Vascular System  o :   § Extremities  § : no edema  · Gastrointestinal  o Abdomen  o : soft, non-tender, non-distended, + bowel sounds, no hepatosplenomegaly, no masses palpated  · Musculoskeletal  o General  o :   § General Musculoskeletal  § : No joint swelling or deformity., Muscle tone, strength, and development grossly normal.  · Neurologic  o Gait and Station  o :   § Gait Screening  § : normal gait  · Psychiatric  o Mood and Affect  o : mood normal, affect appropriate              Assessment  · Chest pain     786.50/R07.9  · Essential hypertension     401.9/I10      Plan  · Orders  o CBC with Auto Diff [facility] ([id number]) - 401.9/I10 - 11/25/2020  o CMP [facility] ([id number]) - 401.9/I10 - 11/25/2020  o Lipid Panel [facility] ([id number]) - 401.9/I10 -  11/25/2020  o Thyroid Profile (THYII) - 401.9/I10 - 11/25/2020  o ACO-39: Current medications updated and reviewed (1159F, ) - - 11/25/2020  o Troponin (77269) - 401.9/I10, 786.50/R07.9 - 11/25/2020  o EKG (Recording and Interpretation) [facility] (Done and read at [facility]) ([id number]) - 401.9/I10, 786.50/R07.9 - 11/25/2020  o Xray chest 2 views [facility] ([id number]) - 401.9/I10, 786.50/R07.9 - 11/25/2020  o CARDIOLOGY CONSULTATION (CARDI) - 401.9/I10, 786.50/R07.9 - 11/25/2020  · Medications  o lisinopril-hydrochlorothiazide 10-12.5 mg oral tablet   SIG: take 1 tablet by oral route once daily for 30 days   DISP: (30) Tablet with 1 refills  Prescribed on 11/25/2020     o metoprolol succinate 50 mg oral tablet extended release 24 hr   SIG: take 1 tablet (50 mg) by oral route once daily for 90 days   DISP: (90) Tablet with 1 refills  Discontinued on 11/25/2020     o Medications have been Reconciled  o Transition of Care or Provider Policy  · Instructions  o Patient was educated/instructed on their diagnosis, treatment and medications prior to discharge from the clinic today.  o Pt told to go to ER immediately if his chest pains return, especially if they last five minutes or longer.            Electronically Signed by: Garfield Viveros MD -Author on November 25, 2020 01:51:29 PM

## 2021-05-07 NOTE — PROGRESS NOTES
Progress Note      Patient Name: Rick Weems   Patient ID: [id number]   Sex: Male   YOB: 1985        Visit Date: December 2, 2020    Provider: Garfield Viveros MD   Location: [facility]   Location Address: [address] [place] [address] [address] [place]  [address]   Location Phone: [phone]          Chief Complaint     1 WEEK FOLLOW UP ON BP       History Of Present Illness  Rick Weems is a 35 year old /White male who presents for evaluation and treatment of:      discussed labs- labs showed no significant abnormalities  HTN- uncontrolled but getting better controlled- pt says he feels much better  pt tolerating med well  will check BMP due to new start to HCTZ       Past Medical History  Disease Name Date Onset Notes   Anxiety --  --    Hypertension, Benign Essential --  --    Seasonal allergies --  --          Past Surgical History  Procedure Name Date Notes   *Denies any surgical procedures --  --          Medication List  Name Date Started Instructions   lisinopril-hydrochlorothiazide 10-12.5 mg oral tablet 11/25/2020 take 1 tablet by oral route once daily for 30 days         Allergy List  Allergen Name Date Reaction Notes   NO KNOWN DRUG ALLERGIES --  --  --          Family Medical History  Disease Name Relative/Age Notes   Chronic Obstructive Pulmonary Disease Father/   Father   DM Type II Father/   Father   Hypertension Father/  Mother/   Father; Mother   Cerebrovascular Accident (CVA) Grandfather (maternal)/   Grandfather (maternal)         Social History  Finding Status Start/Stop Quantity Notes   Alcohol Never --/-- --  never drinks alcohol   Exercises regularly --  --/-- --  3-4 times per week   Recreational Drug Use Never --/-- --  never used   Second hand smoke exposure Unknown --/-- --  yes   Tobacco Former --/-- 0.5 PPD former smoker, smokes less than 1 pack per day, for 5 years, never uses other tobacco products   Uses seatbelts --  --/-- --  yes          Immunizations  NameDate Admin Mfg Trade Name Lot Number Route Inj VIS Given VIS Publication   Ubwwpflnu10/27/2018 SKB FLUVIRIN [id number] IM RD 02/27/2018 08/07/2015   Comments: [id number]         Review of Systems  · Constitutional  o Denies  o : fatigue, fever  · Cardiovascular  o Denies  o : chest pain, palpitations  · Respiratory  o Denies  o : shortness of breath, cough  · Gastrointestinal  o Denies  o : nausea, vomiting, diarrhea  · Psychiatric  o Denies  o : anxiety, depression      Vitals  Date Time BP Position Site L\R Cuff Size HR RR TEMP (F) WT  HT  BMI kg/m2 BSA m2 O2 Sat FR L/min FiO2 HC       12/02/2020 08:48 /82 Sitting    76 - R  97.7 161lbs 0oz    96 %            Physical Examination  · Constitutional  o Appearance  o : well developed, well-nourished, in no acute distress  · Respiratory  o Respiratory Effort  o : breathing unlabored  o Auscultation of Lungs  o : clear to ascultation  · Cardiovascular  o Heart  o :   § Auscultation of Heart  § : regular rate and rhythm  o Peripheral Vascular System  o :   § Extremities  § : no edema  · Musculoskeletal  o General  o :   § General Musculoskeletal  § : No joint swelling or deformity., Muscle tone, strength, and development grossly normal.  · Neurologic  o Gait and Station  o :   § Gait Screening  § : normal gait  · Psychiatric  o Mood and Affect  o : mood normal, affect appropriate          Assessment  · Essential hypertension     401.9/I10      Plan  · Orders  o [facility] ([id number]) - 401.9/I10 - 12/02/2020  o ACO-39: Current medications updated and reviewed (1159F, ) - - 12/02/2020  · Medications  o lisinopril-hydrochlorothiazide 20-12.5 mg oral tablet   SIG: take 1 tablet by oral route once daily for 30 days   DISP: (30) Tablet with 5 refills  Adjusted on 12/02/2020     o Medications have been Reconciled  o Transition of Care or Provider Policy  · Instructions  o Patient was educated/instructed on their diagnosis, treatment and  medications prior to discharge from the clinic today.            Electronically Signed by: Garfield Viveros MD -Author on December 2, 2020 09:03:40 AM

## 2021-05-07 NOTE — PROGRESS NOTES
Progress Note      Patient Name: Rick Weems   Patient ID: [id number]   Sex: Male   YOB: 1985        Visit Date: August 30, 2018    Provider: KWAME Carrasco   Location: [facility]   Location Address: [address] [place] [address]  [id number]   Location Phone: [phone]          Chief Complaint     here for med refills and labs.       History Of Present Illness  Rick Weems is a 32 year old /White male who presents for evaluation and treatment of:      HTN: stable - normal readings with checking BP at home usually 120-130/80;       Past Medical History  Disease Name Date Onset Notes   Anxiety --  --    Hypertension, Benign Essential --  --    Seasonal allergies --  --          Past Surgical History  Procedure Name Date Notes   *Denies any surgical procedures --  --          Medication List  Name Date Started Instructions   metoprolol succinate 50 mg oral tablet extended release 24 hr 02/27/2018 TAKE ONE TABLET BY MOUTH DAILY         Allergy List  Allergen Name Date Reaction Notes   PENICILLINS --  --  --          Family Medical History  Disease Name Relative/Age Notes   Cerebrovascular Accident (CVA) / Grandfather (maternal)    Grandfather (maternal)/    Chronic Obstructive Pulmonary Disease / Father    Father/    DM Type II / Father    Father/    Hypertension / Father; Mother    Father/     Mother/          Social History  Finding Status Start/Stop Quantity Notes   Alcohol Never --/-- --  never drinks alcohol   Exercises regularly --  --/-- --  3-4 times per week   Recreational Drug Use Never --/-- --  never used   Second hand smoke exposure Unknown --/-- --  yes   Tobacco Former --/-- 0.5 PPD former smoker, smokes less than 1 pack per day, for 5 years, never uses other tobacco products   Uses seatbelts --  --/-- --  yes         Immunizations  NameDate Admin Mfg Trade Name Lot Number Route Inj VIS Given VIS Publication   [id number]/27/2018 ERNESTO Fluarix  Quadrivalent [id number] IM RD 02/27/2018 08/07/2015   Comments: [id number]         Review of Systems  · Constitutional  o Denies  o : fatigue, fever, chills, body aches, weight loss, weight gain  · Cardiovascular  o Denies  o : chest pain, lightheadedness, palpitations  · Respiratory  o Denies  o : wheezing, cough, asthma or wheezing  · Allergic-Immunologic  o Admits  o : sinus allergy symptoms      Vitals  Date Time BP Position Site L\R Cuff Size HR RR TEMP(F) WT  HT  BMI kg/m2 BSA m2 O2 Sat        08/30/2018 09:57 /70 Sitting    74 - R  97.5 158lbs 4oz    98 %           Physical Examination  · Constitutional  o Appearance  o : well developed, well-nourished, in no acute distress  · Eyes  o Conjunctivae  o : conjunctivae normal  o Pupils and Irises  o : pupils equal and round, pupils reactive to light bilaterally  · Neck  o Inspection/Palpation  o : supple  o Thyroid  o : no thyromegaly  · Respiratory  o Respiratory Effort  o : breathing unlabored  o Auscultation of Lungs  o : clear to ascultation  · Cardiovascular  o Heart  o :   § Auscultation of Heart  § : regular rate and rhythm  o Peripheral Vascular System  o :   § Extremities  § : no edema  · Lymphatic  o Neck  o : no lymphadenopathy present  · Musculoskeletal  o General  o :   § General Musculoskeletal  § : No joint swelling or deformity. Muscle tone, strength, and development grossly normal.  · Skin and Subcutaneous Tissue  o General Inspection  o : NL tone  · Neurologic  o Gait and Station  o :   § Gait Screening  § : normal gait  · Psychiatric  o Mood and Affect  o : mood normal, affect appropriate              Assessment  · Essential hypertension     401.9/I10      Plan  · Orders  o CBC with Auto Diff [facility] (07451) - 401.9/I10 - 08/30/2018  o CMP [facility] (10990) - 401.9/I10 - 08/30/2018  o Lipid Panel [facility] (59997) - 401.9/I10 - 08/30/2018  o Urinalysis with Reflex Microscopy if abnormal ([facility]) (90263) - 401.9/I10 - 08/30/2018  o ACO-39: Current  medications updated and reviewed () - - 08/30/2018  · Medications  o metoprolol succinate 50 mg oral tablet extended release 24 hr   SIG: TAKE ONE TABLET BY MOUTH DAILY   DISP: (90) Tablet with 1 refills  Adjusted on 08/30/2018     · Instructions  o Patient advised to monitor blood pressure (B/P) at home and journal readings. Patient informed that a B/P reading at home of more than 135/85 is considered hypertension. For readings greater nieg158/90 or higher patient is advised to follow up in the office with readings for management. Patient advised to limit sodium intake.  o Take all medications as prescribed/directed.  o Patient was educated/instructed on their diagnosis, treatment and medications prior to discharge from the clinic today.  o Chronic conditions reviewed and taken into consideration for today's treatment plan.  · Disposition  o Follow up as needed.            Electronically Signed by: KWAME Carrasco -Author on August 30, 2018 10:25:23 AM

## 2021-05-07 NOTE — PROGRESS NOTES
Progress Note      Patient Name: Rick Weems   Patient ID: [id number]   Sex: Male   YOB: 1985        Visit Date: September 17, 2019    Provider: KWAME Carrasco   Location: [facility]   Location Address: [address] [place] [address] [place]  [address]   Location Phone: [phone]          Chief Complaint     refill medication       History Of Present Illness  Rick Weems is a 33 year old /White male who presents for evaluation and treatment of:      refill of medication    HTN: stable on Metoprolol - checks regularly at home and highest readings are about 130/80 - physical activity includes work as a  - diet: regular, tuna, bananas - lots of protein denies eating frequent breads or snacks    Ate pancakes and coffee this morning       Past Medical History  Disease Name Date Onset Notes   Anxiety --  --    Hypertension, Benign Essential --  --    Seasonal allergies --  --          Past Surgical History  Procedure Name Date Notes   *Denies any surgical procedures --  --          Medication List  Name Date Started Instructions   metoprolol succinate 50 mg oral tablet extended release 24 hr 03/05/2019 TAKE ONE TABLET BY MOUTH DAILY         Allergy List  Allergen Name Date Reaction Notes   PENICILLINS --  --  --        Allergies Reconciled  Family Medical History  Disease Name Relative/Age Notes   Chronic Obstructive Pulmonary Disease Father/   Father   DM Type II Father/   Father   Hypertension Father/  Mother/   Father; Mother   Cerebrovascular Accident (CVA) Grandfather (maternal)/   Grandfather (maternal)         Social History  Finding Status Start/Stop Quantity Notes   Alcohol Never --/-- --  never drinks alcohol   Exercises regularly --  --/-- --  3-4 times per week   Recreational Drug Use Never --/-- --  never used   Second hand smoke exposure Unknown --/-- --  yes   Tobacco Former --/-- 0.5 PPD former smoker, smokes less than 1 pack per day, for  "5 years, never uses other tobacco products   Uses seatbelts --  --/-- --  yes         Immunizations  NameDate Admin Mfg Trade Name Lot Number Route Inj VIS Given VIS Publication   [id number]/27/2018 SKB FLUVIRIN [id number] IM RD 02/27/2018 08/07/2015   Comments: [id number]         Review of Systems  · Constitutional  o Denies  o : fever, headache, chills, body aches, weight loss, weight gain  · Cardiovascular  o Denies  o : chest pain, lower extremity edema, lightheadedness, palpitations  · Respiratory  o Denies  o : shortness of breath, wheezing, cough      Vitals  Date Time BP Position Site L\R Cuff Size HR RR TEMP (F) WT  HT  BMI kg/m2 BSA m2 O2 Sat        09/17/2019 09:26 /84 Sitting    71 - R  96.6 155lbs 0oz 5'  10\" 22.24 1.86 99 %          Physical Examination  · Constitutional  o Appearance  o : well developed, well-nourished, in no acute distress  · Eyes  o Conjunctivae  o : conjunctivae normal  o Pupils and Irises  o : pupils equal and round, pupils reactive to light bilaterally  · Neck  o Inspection/Palpation  o : supple  o Thyroid  o : no thyromegaly  · Respiratory  o Respiratory Effort  o : breathing unlabored  o Auscultation of Lungs  o : clear to ascultation  · Cardiovascular  o Heart  o :   § Auscultation of Heart  § : regular rate and rhythm  o Peripheral Vascular System  o :   § Extremities  § : no edema  · Lymphatic  o Neck  o : no lymphadenopathy present  · Musculoskeletal  o General  o :   § General Musculoskeletal  § : No joint swelling or deformity. Muscle tone, strength, and development grossly normal.  · Skin and Subcutaneous Tissue  o General Inspection  o : NL tone  · Neurologic  o Gait and Station  o :   § Gait Screening  § : normal gait  · Psychiatric  o Mood and Affect  o : mood normal, affect appropriate              Assessment  · Essential hypertension     401.9/I10    Problems Reconciled  Plan  · Orders  o CBC with Auto Diff [facility] (19871) - 401.9/I10 - 09/17/2019  o CMP [facility] " (99254) - 401.9/I10 - 09/17/2019  o Lipid Panel [facility] (52205) - 401.9/I10 - 09/17/2019  o ACO-39: Current medications updated and reviewed () - - 09/17/2019  · Medications  o metoprolol succinate 50 mg oral tablet extended release 24 hr   SIG: TAKE ONE TABLET BY MOUTH DAILY   DISP: (90) Tablet with 1 refills  Adjusted on 09/17/2019     o Medications have been Reconciled  o Transition of Care or Provider Policy  · Instructions  o Take all medications as prescribed/directed.  o Patient was educated/instructed on their diagnosis, treatment and medications prior to discharge from the clinic today.  · Disposition  o Follow up as needed.     Pt states he will try to have labs drawn but he is between insurances currently.             Electronically Signed by: KWAME Carrasco -Author on September 17, 2019 09:46:11 AM

## 2021-05-07 NOTE — PROGRESS NOTES
Progress Note      Patient Name: Rick Weems   Patient ID: [id number]   Sex: Male   YOB: 1985        Visit Date: Adwoa 3, 2020    Provider: KWAME Carrasco   Location: [facility]   Location Address: [address] [place] [address] [place]  [address]   Location Phone: [phone]          Chief Complaint     refill       History Of Present Illness  Rick Weems is a 34 year old /White male who presents for evaluation and treatment of:      HTN: due for refills of medication - pt is not fasting, ate sausage biscuit - last labs in 8/2018 (nearly 2 years ago) - monitors BP at home and readings are usually 120-125/80 - does a lot of walking for acitivity - diet is good with high fruit and vegetables and high protein including chicken and fish     right upper tooth with filling that fell out and states that tooth broke off and now having pain and swelling and knot and pressure in the area - dentist appointment in 5 days - denies fever chills or body aches    denies loss of taste or smell       Past Medical History  Disease Name Date Onset Notes   Anxiety --  --    Hypertension, Benign Essential --  --    Seasonal allergies --  --          Past Surgical History  Procedure Name Date Notes   *Denies any surgical procedures --  --          Medication List  Name Date Started Instructions   metoprolol succinate 50 mg oral tablet extended release 24 hr 06/03/2020 take 1 tablet (50 mg) by oral route once daily for 90 days         Allergy List  Allergen Name Date Reaction Notes   NO KNOWN DRUG ALLERGIES --  --  --        Allergies Reconciled  Family Medical History  Disease Name Relative/Age Notes   Chronic Obstructive Pulmonary Disease Father/   Father   DM Type II Father/   Father   Hypertension Father/  Mother/   Father; Mother   Cerebrovascular Accident (CVA) Grandfather (maternal)/   Grandfather (maternal)         Social History  Finding Status Start/Stop Quantity Notes  "  Alcohol Never --/-- --  never drinks alcohol   Exercises regularly --  --/-- --  3-4 times per week   Recreational Drug Use Never --/-- --  never used   Second hand smoke exposure Unknown --/-- --  yes   Tobacco Former --/-- 0.5 PPD former smoker, smokes less than 1 pack per day, for 5 years, never uses other tobacco products   Uses seatbelts --  --/-- --  yes         Immunizations  NameDate Admin Mfg Trade Name Lot Number Route Inj VIS Given VIS Publication   [id number]/27/2018 SKB FLUVIRIN [id number] IM RD 02/27/2018 08/07/2015   Comments: [id number]         Review of Systems  · Constitutional  o Denies  o : fever, chills, body aches, weight loss, weight gain  · HENT  o * See HPI  · Cardiovascular  o Denies  o : chest pain, lower extremity edema, lightheadedness, palpitations  · Respiratory  o Denies  o : shortness of breath, wheezing, cough  · Neurologic  o Denies  o : headaches, dizziness      Vitals  Date Time BP Position Site L\R Cuff Size HR RR TEMP (F) WT  HT  BMI kg/m2 BSA m2 O2 Sat        06/03/2020 09:55 /80 Sitting    88 - R  98 158lbs 6oz 5'  10\" 22.72 1.88 98 %          Physical Examination  · Constitutional  o Appearance  o : well developed, well-nourished, in no acute distress  · Eyes  o Conjunctivae  o : conjunctivae normal  o Pupils and Irises  o : pupils equal and round, pupils reactive to light bilaterally  · Ears, Nose, Mouth and Throat  o Ears  o :   § External Ears  § : no auricle tenderness to palpation present  o Oral Cavity  o :   § Teeth  § : dental caries present; right upper tooth broken off below the gum line with erythema and mild swelling noted  · Neck  o Inspection/Palpation  o : supple  o Thyroid  o : no thyromegaly  · Respiratory  o Respiratory Effort  o : breathing unlabored  o Auscultation of Lungs  o : clear to ascultation  · Cardiovascular  o Heart  o :   § Auscultation of Heart  § : regular rate and rhythm  o Peripheral Vascular System  o :   § Extremities  § : no " edema  · Lymphatic  o Neck  o : no lymphadenopathy present  · Musculoskeletal  o General  o :   § General Musculoskeletal  § : No joint swelling or deformity. Muscle tone, strength, and development grossly normal.  · Skin and Subcutaneous Tissue  o General Inspection  o : NL tone  · Neurologic  o Gait and Station  o :   § Gait Screening  § : normal gait  · Psychiatric  o Mood and Affect  o : mood normal, affect appropriate              Assessment  · Essential hypertension     401.9/I10  · Tooth pain     525.9/K08.89    Problems Reconciled  Plan  · Orders  o HTN/Lipid Panel (CMP, Lipid) [facility] (16482, [address]7774) - 401.9/I10 - 06/03/2020  o CBC with Auto Diff [facility] (16836) - 401.9/I10 - 06/03/2020  o ACO-18: Negative screen for clinical depression using a standardized tool () - - 06/03/2020  o ACO-39: Current medications updated and reviewed () - - 06/03/2020  · Medications  o Augmentin 500-125 mg oral tablet   SIG: take 1 tablet by oral route every 12 hours for 10 days   DISP: (20) tablets with 0 refills  Prescribed on 06/03/2020     o metoprolol succinate 50 mg oral tablet extended release 24 hr   SIG: take 1 tablet (50 mg) by oral route once daily for 90 days   DISP: (90) Tablet with 1 refills  Adjusted on 06/03/2020     o Medications have been Reconciled  o Transition of Care or Provider Policy  · Instructions  o Patient was educated/instructed on their diagnosis, treatment and medications prior to discharge from the clinic today.            Electronically Signed by: KWAME Carrasco -Author on Adwoa 3, 2020 10:39:12 AM

## 2021-05-07 NOTE — PROGRESS NOTES
Progress Note      Patient Name: Rick Weems   Patient ID: [id number]   Sex: Male   YOB: 1985        Visit Date: March 5, 2019    Provider: KWAME Carrasco   Location: [facility]   Location Address: [address] [place] [address]  [id number]   Location Phone: [phone]          Chief Complaint     refill       History Of Present Illness  Rick Weems is a 33 year old /White male who presents for evaluation and treatment of:      refills of medication -     HTN: systolic readings have been elevating slightly - family hx of strokes, causes nervousness about family hx - both parents have HTN - denies fatigue, lightheaded, CP, palpitations, or headaches       Past Medical History  Disease Name Date Onset Notes   Anxiety --  --    Hypertension, Benign Essential --  --    Seasonal allergies --  --          Past Surgical History  Procedure Name Date Notes   *Denies any surgical procedures --  --          Medication List  Name Date Started Instructions   metoprolol succinate 50 mg oral tablet extended release 24 hr 08/30/2018 TAKE ONE TABLET BY MOUTH DAILY         Allergy List  Allergen Name Date Reaction Notes   PENICILLINS --  --  --          Family Medical History  Disease Name Relative/Age Notes   Cerebrovascular Accident (CVA) / Grandfather (maternal)    Grandfather (maternal)/    Chronic Obstructive Pulmonary Disease / Father    Father/    DM Type II / Father    Father/    Hypertension / Father; Mother    Father/     Mother/          Social History  Finding Status Start/Stop Quantity Notes   Alcohol Never --/-- --  never drinks alcohol   Exercises regularly --  --/-- --  3-4 times per week   Recreational Drug Use Never --/-- --  never used   Second hand smoke exposure Unknown --/-- --  yes   Tobacco Former --/-- 0.5 PPD former smoker, smokes less than 1 pack per day, for 5 years, never uses other tobacco products   Uses seatbelts --  --/-- --  yes  "        Immunizations  NameDate Admin Mfg Trade Name Lot Number Route Inj VIS Given VIS Publication   [id number]/27/2018 SKB Fluarix Quadrivalent [id number] IM RD 02/27/2018 08/07/2015   Comments: [id number]         Review of Systems  · Constitutional  o Denies  o : fatigue, fever, headache, chills, body aches  · Cardiovascular  o Denies  o : chest pain, lower extremity edema, palpitations  · Respiratory  o Denies  o : shortness of breath, wheezing, cough  · Gastrointestinal  o Denies  o : nausea, vomiting, diarrhea      Vitals  Date Time BP Position Site L\R Cuff Size HR RR TEMP(F) WT  HT  BMI kg/m2 BSA m2 O2 Sat HC       03/05/2019 01:00 /80 Sitting    85 - R  97.8 154lbs 6oz 5'  10\" 22.15 1.86 98 %     03/05/2019 02:02 /84 Sitting                     Physical Examination  · Constitutional  o Appearance  o : well developed, well-nourished, in no acute distress  · Eyes  o Conjunctivae  o : conjunctivae normal  o Pupils and Irises  o : pupils equal and round, pupils reactive to light bilaterally  · Neck  o Inspection/Palpation  o : supple  o Thyroid  o : no thyromegaly  · Respiratory  o Respiratory Effort  o : breathing unlabored  o Auscultation of Lungs  o : clear to ascultation  · Cardiovascular  o Heart  o :   § Auscultation of Heart  § : regular rate and rhythm  o Peripheral Vascular System  o :   § Extremities  § : no edema  · Lymphatic  o Neck  o : no lymphadenopathy present  · Musculoskeletal  o General  o :   § General Musculoskeletal  § : No joint swelling or deformity. Muscle tone, strength, and development grossly normal.  · Skin and Subcutaneous Tissue  o General Inspection  o : NL tone  · Neurologic  o Gait and Station  o :   § Gait Screening  § : normal gait  · Psychiatric  o Mood and Affect  o : mood normal, affect appropriate              Assessment  · Essential hypertension     401.9/I10      Plan  · Orders  o ACO-39: Current medications updated and reviewed () - - " 03/05/2019  · Medications  o metoprolol succinate 50 mg oral tablet extended release 24 hr   SIG: TAKE ONE TABLET BY MOUTH DAILY   DISP: (90) Tablet with 1 refills  Adjusted on 03/05/2019     · Instructions  o Take all medications as prescribed/directed.  o Patient was educated/instructed on their diagnosis, treatment and medications prior to discharge from the clinic today.  o Will repeat labs in August. Last lipid panel normal.  · Disposition  o Follow up as needed.            Electronically Signed by: KWAME Carrasco -Author on March 5, 2019 02:02:52 PM

## 2021-05-09 VITALS
DIASTOLIC BLOOD PRESSURE: 84 MMHG | BODY MASS INDEX: 22.1 KG/M2 | HEIGHT: 70 IN | HEART RATE: 85 BPM | TEMPERATURE: 97.8 F | OXYGEN SATURATION: 98 % | WEIGHT: 154.37 LBS | SYSTOLIC BLOOD PRESSURE: 140 MMHG | SYSTOLIC BLOOD PRESSURE: 128 MMHG | DIASTOLIC BLOOD PRESSURE: 80 MMHG

## 2021-05-09 VITALS
BODY MASS INDEX: 22.19 KG/M2 | SYSTOLIC BLOOD PRESSURE: 130 MMHG | OXYGEN SATURATION: 99 % | WEIGHT: 155 LBS | HEART RATE: 71 BPM | HEIGHT: 70 IN | DIASTOLIC BLOOD PRESSURE: 84 MMHG | TEMPERATURE: 96.6 F

## 2021-05-09 VITALS
OXYGEN SATURATION: 98 % | TEMPERATURE: 98 F | DIASTOLIC BLOOD PRESSURE: 80 MMHG | SYSTOLIC BLOOD PRESSURE: 128 MMHG | WEIGHT: 158.37 LBS | HEART RATE: 88 BPM | HEIGHT: 70 IN | BODY MASS INDEX: 22.67 KG/M2

## 2021-05-09 VITALS
DIASTOLIC BLOOD PRESSURE: 90 MMHG | WEIGHT: 147 LBS | TEMPERATURE: 97.5 F | HEIGHT: 69 IN | SYSTOLIC BLOOD PRESSURE: 144 MMHG | OXYGEN SATURATION: 92 % | HEART RATE: 100 BPM | BODY MASS INDEX: 21.77 KG/M2

## 2021-05-09 VITALS
WEIGHT: 161 LBS | DIASTOLIC BLOOD PRESSURE: 100 MMHG | SYSTOLIC BLOOD PRESSURE: 178 MMHG | OXYGEN SATURATION: 99 % | TEMPERATURE: 98 F | HEART RATE: 88 BPM

## 2021-05-09 VITALS
WEIGHT: 155.37 LBS | BODY MASS INDEX: 22.24 KG/M2 | SYSTOLIC BLOOD PRESSURE: 138 MMHG | TEMPERATURE: 97.6 F | OXYGEN SATURATION: 99 % | HEART RATE: 73 BPM | DIASTOLIC BLOOD PRESSURE: 80 MMHG | HEIGHT: 70 IN

## 2021-05-09 VITALS
OXYGEN SATURATION: 98 % | TEMPERATURE: 97.5 F | DIASTOLIC BLOOD PRESSURE: 70 MMHG | SYSTOLIC BLOOD PRESSURE: 118 MMHG | WEIGHT: 158.25 LBS | HEART RATE: 74 BPM

## 2021-05-09 VITALS
WEIGHT: 161 LBS | OXYGEN SATURATION: 96 % | HEART RATE: 76 BPM | DIASTOLIC BLOOD PRESSURE: 82 MMHG | SYSTOLIC BLOOD PRESSURE: 140 MMHG | TEMPERATURE: 97.7 F

## 2021-05-10 NOTE — H&P
"   History and Physical      Patient Name: Rick Weems   Patient ID: [id number]   Sex: Male   YOB: 1985    Primary Care Provider: Garfield Viveros MD   Referring Provider: Garfield Viveros MD    Visit Date: March 18, 2021    Provider: Emily Miller MD   Location: [facility]   Location Address: [address] [place] [address]  [id number]   Location Phone: [phone]          Chief Complaint  · Patient is here for urological concerns      History Of Present Illness     35-year-old gentleman known to urology due to inpatient consultation for urinary retention, a MADDY, and hydronephrosis.  He has past medical history significant for hypertension. Admitted 3/6/2021, 3 weeks prior to admission he reported progressively less urine output.  Also reported associated perineal discomfort; had to sit to void.  Patient was noted to be in retention of greater than 1 L upon catheter placement.  Found to have LYDIA of 9.25 which had decreased with catheter prior to admission to 5.41. UA was unremarkable. Retroperitoneal ultrasound indicates moderate to severe bilateral hydroureteronephrosis and debris-filled urinary bladder.     History of \"prostate issues;\" evaluated by urologist many years ago and was told that his prostate was \"swollen.\"  Reports baseline urinary hesitancy.       Presents today for follow-up; catheter in place since discharge.  Had CT scan and BMP prior to today's appointment.  Patient states he feels much better.  Denies significant bother with catheter.  Afraid of going on dialysis.  \"Just wants to get better.\"  ___________  CT abdomen pelvis Noncon, 3/15/2021: Diffuse marked urinary bladder wall thickening likely secondary to cystitis.  Suggest correlation with cystoscopy. 2. Evidence of obstruction at the ureterovesicle junctions bilaterally with mild right and mild-to-moderate left hydronephrosis.  The hydronephrosis is new since 8/31/2016.    Creatinine  3/12/2021: " "4.28  3/8/2021: 5.41  3/7/2021: 6.88  3/6/2021: 7.19  3/5/2021: 9.45  12/2/2020: 1.47  11/25/2020: 1.27         Past Medical History  HBP (high blood pressure); High blood pressure; Prostate Disorder         Past Surgical History  *I have had no surgeries; *No Past Surgical History         Medication List  metoprolol succinate 50 mg oral tablet extended release 24 hr         Allergy List  NO KNOWN DRUG ALLERGIES; PENICILLINS       Allergies Reconciled  Family Medical History  Diabetes, unspecified type; Renal Calculus; Diabetes         Social History  Alcohol (Never); ; Single; Tobacco (Former)         Review of Systems  · Constitutional  o Denies  o : chills, fever  · Gastrointestinal  o Denies  o : nausea, vomiting      Vitals  Date Time BP Position Site L\R Cuff Size HR RR TEMP (F) WT  HT  BMI kg/m2 BSA m2 O2 Sat FR L/min FiO2 HC       03/18/2021 02:13 PM       12  144lbs 6oz 5'  10\" 20.72 1.8             Physical Examination  · Constitutional  o Appearance  o : Well nourished, well developed patient in no acute distress.  · Eyes  o Conjunctivae  o : Conjunctivae normal  o Sclerae  o : Sclerae white  · Ears, Nose, Mouth and Throat  o Ears  o :   § Hearing  § : Intact to conversational voice both ears  o Nose  o :   § External Nose  § : Appearance normal  · Gastrointestinal  o Abdominal Examination  o : Appears soft and nondistended  · Genitourinary  o Bladder  o : nontender to palpation; catheter noted to be positional; upon palpation of bladder able to expel more urine out of catheter; irrigated easily; no clot or sediment  · Skin and Subcutaneous Tissue  o General Inspection  o : No rashes, lesions, or areas of discoloration present  o General Palpation  o : Skin Turgor Normal  · Neurologic  o Mental Status Examination  o :   § Orientation  § : Alert and Oriented X3  o Gait and Station  o :   § Gait Screening  § : Ambulating without difficulty  · Psychiatric  o Mood and Affect  o : Mood " normal, affect appropriate          Results     [facility]      PACS RADIOLOGY REPORT    Patient: FREDERICK SINGH Acct: #[id number] Report: #[id number]    UNIT #: [id number]  DOS: 03/15/21 1415  INSURANCE:SELF PAY INSURANCE ORDER #:[id number]  LOCATION:[facility]   : 1985    PROVIDERS  ADMITTING:   ATTENDING: ROSEANN BAZAN  FAMILY:  NONE,MD ORDERING:  ROSEANN BAZAN   OTHER:  DICTATING:  Aleksandr Marks MD    REQ #:[id number]   EXAM:ABDPELWO - CT ABDOMEN PELVIS wo CONTRAST  REASON FOR EXAM:  URINARY RENTENION,LYDIA,PROSTETITIS  REASON FOR VISIT:  URINARY RENTENION,LYDIA,PROSTETITIS    *******Signed******     PROCEDURE: CT ABDOMEN PELVIS WITHOUT CONTRAST     COMPARISON: [facility], CT, ABD PEL W/O CONTRAST, 2016, 11:10.     INDICATIONS: URINARY RENTENION,ACUTE KIDNEY INJURY, PROSTATITIS     TECHNIQUE: CT images were created without intravenous contrast.       PROTOCOL:   Standard imaging protocol performed      RADIATION:   DLP: 653mGy*cm    Automated exposure control was utilized to minimize radiation dose.      FINDINGS:   The lung bases are clear bilaterally.     The liver, gallbladder, spleen, pancreas and adrenal glands appear unremarkable.  Mild right and   mild-to-moderate left hydronephrosis is noted.  Severe distal hydroureter is noted bilaterally.    The urinary bladder demonstrates diffuse, marked wall thickening.  A similar appearance was noted   on the 2016 exam.  A Jennings catheter has been placed in good position.  The prostate and   seminal vesicles appear within normal limits.  No adenopathy or free fluid is seen in the abdomen   or pelvis.     No acute bowel abnormality is seen.  The appendix appears normal.  The lack of oral contrast limits   evaluation of the bowel.     No focal osseous lesion is seen.     CONCLUSION:   1. Diffuse marked urinary bladder wall thickening likely secondary to cystitis.  Suggest    correlation with cystoscopy.  2. Evidence of obstruction at the ureterovesicle junctions bilaterally with mild right and   mild-to-moderate left hydronephrosis.  The hydronephrosis is new since 2016.            Aleksandr Marks M.D.         Electronically Signed and Approved By: Aleksandr Marks M.D. on 3/15/2021 at 15:12                     Until signed, this is an unconfirmed preliminary report that may contain  errors and is subject to change.                DAGMARRRO:  D:03/15/21 1512        [facility]      PACS RADIOLOGY REPORT    Patient: FREDERICK SINGH Acct: #[id number] Report: #[id number]    UNIT #: [id number]  DOS: 21 1826  INSURANCE:SELF PAY INSURANCE ORDER #:[id number]  LOCATION:[facility]  0999-04 : 1985    PROVIDERS  ADMITTING:  Sydney Jarquin ATTENDING: Sydney Jarquin  FAMILY:  NONE,MD ORDERING:  FRIDA JAIMES   OTHER:  DICTATING:  Roshan Harper MD    REQ #:[id number]   EXAM:CRETR - COMPLETE RETROPERI YAQUELIN KIDNEYS  REASON FOR EXAM:  Renal Failure  REASON FOR VISIT:  ACUTE RENAL FAILURE    *******Signed******     PROCEDURE: U/S BILATERAL  KIDNEYS     COMPARISON: None.     INDICATIONS: Renal Failure     TECHNIQUE: Ultrasound examination of the kidneys and bladder was performed.       FINDINGS:   RIGHT KIDNEY: Moderate right hydronephrosis and proximal right hydroureter.  The right kidney   measures 10.6 x 5.8 x 6.2 cm.  Ureters  LEFT KIDNEY: Moderate left hydronephrosis and left hydroureter.  The left kidney measures 12.3 x   6.4 x 5.7 cm.   Both ureters are dilated to the UVJ.  There is debris within the urinary bladder but the source of   obstructive uropathy is not readily apparent on the sonogram no dominant renal masses visualized.    Bilateral ureteral jets could not be visualized.     CONCLUSION:   1. Moderate to severe bilateral hydroureteronephrosis with both ureters dilated to the utero   vesicular junctions bilaterally.  Distinct  source of obstructive uropathy not well visualized   sonographically.  2. Debris-filled urinary bladder.              Roshan Harper M.D.         Electronically Signed and Approved By: Roshan Harper M.D. on 3/05/2021 at 20:38                     Until signed, this is an unconfirmed preliminary report that may contain  errors and is subject to change.                RAMSR:  D:03/05/21 2038         Assessment  · LYDIA (acute kidney injury)     584.9/N17.9  · Urinary retention     788.20/R33.9  · Hydronephrosis     591/N13.30    Problems Reconciled  Plan  · Medications  o Medications have been Reconciled  o Transition of Care or Provider Policy  · Instructions  o Electronically Identified Patient Education Materials Provided Electronically     CT scan imaging reviewed as well as BMP.  Results discussed with patient at length.  Jennings catheter in good position on CT scan but bladder not completely emptied, severe bladder wall thickening; irrigated today easily but catheter noted to be somewhat positional.  Patient provided leg bag and bedside drainage bag.  Patient with residual bilateral moderate to severe hydronephrosis; no significant improvement improvement since discharge; hydroureteronephrosis bilaterally down to the UVJ; of unknown etiology; warrants further work-up and potential management.  Creatinine now down to 4.28 from 5.41 at time of discharge; anticipate continued decrease of creatinine with decompression of urinary system.    Recommend further urgent evaluation and management in OR in an attempt to continue to decompress system and lower creatinine.  Plan for cystoscopy, bilateral retrograde pyelogram, possible bilateral ureteral stent placement, possible bladder biopsy and fulguration.  Risk, benefits, and alternatives discussed with patient.  Risk including but not limited to bleeding, infection, damage to surrounding structure, pain, need for further procedures discussed with patient.  He is  agreeable to proceed    Schedule OR  Maintain catheter; continue to monitor BMP  All question addressed      MDM low: 1 stable chronic illness; review of CT scan and labs; minor surgery with identified risk factors             Electronically Signed by: Emily Miller MD -Author on March 18, 2021 05:14:12 PM

## 2021-05-12 ENCOUNTER — OFFICE VISIT CONVERTED (OUTPATIENT)
Dept: UROLOGY | Facility: CLINIC | Age: 36
End: 2021-05-12
Attending: UROLOGY

## 2021-05-12 ENCOUNTER — HOSPITAL ENCOUNTER (OUTPATIENT)
Dept: OTHER | Facility: HOSPITAL | Age: 36
Discharge: HOME | End: 2021-05-12
Attending: UROLOGY

## 2021-05-12 LAB
ANION GAP SERPL CALC-SCNC: 11 MMOL/L (ref 8–19)
BASOPHILS # BLD AUTO: 0.08 10*3/UL (ref 0–0.2)
BASOPHILS NFR BLD AUTO: 1.2 % (ref 0–3)
BUN SERPL-MCNC: 13 MG/DL (ref 5–25)
BUN/CREAT SERPL: 6 {RATIO} (ref 6–20)
CALCIUM SERPL-MCNC: 9.2 MG/DL (ref 8.7–10.4)
CHLORIDE SERPL-SCNC: 106 MMOL/L (ref 99–111)
CONV ABS IMM GRAN: 0.02 10*3/UL (ref 0–0.2)
CONV CO2: 27 MMOL/L (ref 22–32)
CONV IMMATURE GRAN: 0.3 % (ref 0–1.8)
CREAT UR-MCNC: 2.1 MG/DL (ref 0.7–1.2)
DEPRECATED RDW RBC AUTO: 41.5 FL (ref 35.1–43.9)
EOSINOPHIL # BLD AUTO: 0.49 10*3/UL (ref 0–0.7)
EOSINOPHIL # BLD AUTO: 7.2 % (ref 0–7)
ERYTHROCYTE [DISTWIDTH] IN BLOOD BY AUTOMATED COUNT: 12.1 % (ref 11.6–14.4)
GFR SERPLBLD BASED ON 1.73 SQ M-ARVRAT: 39 ML/MIN/{1.73_M2}
GLUCOSE SERPL-MCNC: 95 MG/DL (ref 70–99)
HCT VFR BLD AUTO: 35 % (ref 42–52)
HGB BLD-MCNC: 11.6 G/DL (ref 14–18)
LYMPHOCYTES # BLD AUTO: 1.71 10*3/UL (ref 1–5)
LYMPHOCYTES NFR BLD AUTO: 25 % (ref 20–45)
MCH RBC QN AUTO: 31 PG (ref 27–31)
MCHC RBC AUTO-ENTMCNC: 33.1 G/DL (ref 33–37)
MCV RBC AUTO: 93.6 FL (ref 80–96)
MONOCYTES # BLD AUTO: 0.64 10*3/UL (ref 0.2–1.2)
MONOCYTES NFR BLD AUTO: 9.4 % (ref 3–10)
NEUTROPHILS # BLD AUTO: 3.9 10*3/UL (ref 2–8)
NEUTROPHILS NFR BLD AUTO: 56.9 % (ref 30–85)
NRBC CBCN: 0 % (ref 0–0.7)
OSMOLALITY SERPL CALC.SUM OF ELEC: 290 MOSM/KG (ref 273–304)
PLATELET # BLD AUTO: 251 10*3/UL (ref 130–400)
PMV BLD AUTO: 8.9 FL (ref 9.4–12.4)
POTASSIUM SERPL-SCNC: 4.1 MMOL/L (ref 3.5–5.3)
RBC # BLD AUTO: 3.74 10*6/UL (ref 4.7–6.1)
SODIUM SERPL-SCNC: 140 MMOL/L (ref 135–147)
WBC # BLD AUTO: 6.84 10*3/UL (ref 4.8–10.8)

## 2021-05-14 VITALS — BODY MASS INDEX: 22.67 KG/M2 | RESPIRATION RATE: 18 BRPM | WEIGHT: 153.5 LBS

## 2021-05-14 VITALS — HEIGHT: 70 IN | RESPIRATION RATE: 12 BRPM | WEIGHT: 144.37 LBS | BODY MASS INDEX: 20.67 KG/M2

## 2021-05-14 VITALS — RESPIRATION RATE: 18 BRPM | BODY MASS INDEX: 21.09 KG/M2 | HEIGHT: 70 IN

## 2021-05-14 NOTE — PROGRESS NOTES
"   Progress Note      Patient Name: Rick Weems   Patient ID: [id number]   Sex: Male   YOB: 1985    Primary Care Provider: Garfield Viveros MD    Visit Date: April 1, 2021    Provider: Emily Miller MD   Location: [facility]   Location Address: [address] [place] [address]  [id number]   Location Phone: [phone]          Chief Complaint  · Patient is here for urological concerns      History Of Present Illness     35-year-old gentleman known to urology due to inpatient consultation for urinary retention, a MADDY, and hydronephrosis.  He has past medical history significant for hypertension. Admitted 3/6/2021, 3 weeks prior to admission he reported progressively less urine output.  Also reported associated perineal discomfort; had to sit to void.  Patient was noted to be in retention of greater than 1 L upon catheter placement.  Found to have LYDIA of 9.25 which had decreased with catheter prior to admission to 5.41. UA was unremarkable. Retroperitoneal ultrasound indicates moderate to severe bilateral hydroureteronephrosis and debris-filled urinary bladder.     History of \"prostate issues;\" evaluated by urologist many years ago and was told that his prostate was \"swollen.\"  Reports baseline urinary hesitancy.       Presents today for follow-up; catheter in place since discharge.  Had CT scan and BMP prior to today's appointment.  Patient states he feels much better.  Denies significant bother with catheter.  Afraid of going on dialysis.  \"Just wants to get better.\"    Update 4/1/2021: Patient presents for follow-up after cystoscopy, bladder biopsy, bilateral ureteral stent placement.  Patient has catheter in place.  Denies significant bother with the catheter.  States he is feeling \"great.\"  Saw nephrology, found that somewhat discouraging as unknown how much his kidney function will recover.  ___________  CT abdomen pelvis Noncon, 3/15/2021: Diffuse marked urinary bladder " wall thickening likely secondary to cystitis.  Suggest correlation with cystoscopy. 2. Evidence of obstruction at the ureterovesicle junctions bilaterally with mild right and mild-to-moderate left hydronephrosis.  The hydronephrosis is new since 8/31/2016.    Creatinine  4/1/2019: 3.05  3/19/2021: 3.30  3/12/2021: 4.28  3/8/2021: 5.41  3/7/2021: 6.88  3/6/2021: 7.19  3/5/2021: 9.45  12/2/2020: 1.47  11/25/2020: 1.27         Past Medical History  HBP (high blood pressure); High blood pressure; Prostate Disorder         Past Surgical History  *I have had no surgeries; *No Past Surgical History         Medication List  metoprolol succinate 50 mg oral tablet extended release 24 hr         Allergy List  NO KNOWN DRUG ALLERGIES; PENICILLINS         Family Medical History  Diabetes, unspecified type; Renal Calculus; Diabetes         Social History  Alcohol (Never); ; Single; Tobacco (Former)         Review of Systems  · Constitutional  o Denies  o : fever, chills  · Gastrointestinal  o Denies  o : nausea, vomiting      Vitals  Date Time BP Position Site L\R Cuff Size HR RR TEMP (F) WT  HT  BMI kg/m2 BSA m2 O2 Sat FR L/min FiO2 HC       04/01/2021 10:15 AM       18  153lbs 8oz                Physical Examination  · Constitutional  o Appearance  o : Well nourished, well developed patient in no acute distress.  · Eyes  o Conjunctivae  o : Conjunctivae normal  o Sclerae  o : Sclerae white  · Ears, Nose, Mouth and Throat  o Ears  o :   § Hearing  § : Intact to conversational voice both ears  § Ears  § : Normal  o Nose  o :   § External Nose  § : Appearance normal  · Skin and Subcutaneous Tissue  o General Inspection  o : No rashes, lesions, or areas of discoloration present  o General Palpation  o : Skin Turgor Normal  · Neurologic  o Mental Status Examination  o :   § Orientation  § : Alert and Oriented X3  o Gait and Station  o :   § Gait Screening  § : Ambulating without difficulty  · Psychiatric  o Mood  and Affect  o : Mood normal, affect appropriate          Assessment  · LYDIA (acute kidney injury)     584.9/N17.9  · Urinary retention     788.20/R33.9  · Hydronephrosis     591/N13.30      Plan  · Orders  o BMP Non-fasting [facility] ([id number]) - 584.9/N17.9, 591/N13.30 - 05/10/2021  o CBC with Auto Diff [facility] ([id number]) - 584.9/N17.9, 591/N13.30 - 05/10/2021  · Medications  o Medications have been Reconciled  o Transition of Care or Provider Policy  · Instructions  o Electronically Identified Patient Education Materials Provided Electronically     Lab reviewed, creatinine to 3.05 from 3.3 from 4.28; uncertain if 3 is the sarah and new baseline creatinine as patient currently maximally decompressed    Discussed again that uncertain whether he will eventually have recovery of bladder function or will warrant prolonged decompression. Will warrant UDS once LYDIA and hydronephrosis have resolved    Will warrant subsequent imaging and work-up but; given the initial dilation of  collecting system bilaterally, believe that he may always have some mild persistent hydronephrosis; could consider MAG3 Lasix scan at some point to rule out obstructive uropathy    Plan for initiation of CIC at some point; do not recommend stent removal or initiation of CIC until confirming creatinine sarah.    Discussed again that Jennings catheter and stents are ensuring maximum drainage and decompression of the upper tracts.  Continue Jennings catheter and stents at this time    Encouraged continued follow-up with nephrology     Plan for Jennings catheter change 2 weeks, nurse visit  Follow-up 1 month after catheter change with BMP and CBC prior and to obtain urine culture  All question addressed      MDM low: 2 stable chronic illnesses; reviewed labs; ordered labs             Electronically Signed by: Emily Miller MD -Author on April 2, 2021 08:34:07 AM

## 2021-05-19 ENCOUNTER — HOSPITAL ENCOUNTER (OUTPATIENT)
Dept: URGENT CARE | Facility: CLINIC | Age: 36
Discharge: HOME | End: 2021-05-19
Attending: FAMILY MEDICINE

## 2021-06-05 NOTE — PROGRESS NOTES
"   Progress Note      Patient Name: Rick Weems   Patient ID: [id number]   Sex: Male   YOB: 1985    Primary Care Provider: Garfield Viveros MD    Visit Date: May 12, 2021    Provider: Emily Miller MD   Location: [facility]   Location Address: [address] [place] [address]  [id number]   Location Phone: [phone]          Chief Complaint  · Patient is here for urological concerns      History Of Present Illness     35-year-old gentleman known to urology due to inpatient consultation for urinary retention, a MADDY, and hydronephrosis.  He has past medical history significant for hypertension. Admitted 3/6/2021, 3 weeks prior to admission he reported progressively less urine output.  Also reported associated perineal discomfort; had to sit to void.  Patient was noted to be in retention of greater than 1 L upon catheter placement.  Found to have LYDIA of 9.25 which had decreased with catheter prior to admission to 5.41. UA was unremarkable. Retroperitoneal ultrasound indicates moderate to severe bilateral hydroureteronephrosis and debris-filled urinary bladder.     History of \"prostate issues;\" evaluated by urologist many years ago and was told that his prostate was \"swollen.\"  Reports baseline urinary hesitancy.       Presents today for follow-up; catheter in place since discharge.  Had CT scan and BMP prior to today's appointment.  Patient states he feels much better.  Denies significant bother with catheter.  Afraid of going on dialysis.  \"Just wants to get better.\"    Update 4/1/2021: Patient presents for follow-up after cystoscopy, bladder biopsy, bilateral ureteral stent placement.  Patient has catheter in place.  Denies significant bother with the catheter.  States he is feeling \"great.\"  Saw nephrology, found that somewhat discouraging as unknown how much his kidney function will recover.    Update 5/12/2021: Presents for follow-up with labs prior.  Patient states he " "feels great.  Has indwelling catheter and bilateral stents in place.  Hoping to be able to start intermittent cath.  States some generalized discomfort with indwelling cath but, remains motivated to \"do what is best for his kidneys.\"  ___________  CT abdomen pelvis Noncon, 3/15/2021: Diffuse marked urinary bladder wall thickening likely secondary to cystitis.  Suggest correlation with cystoscopy. 2. Evidence of obstruction at the ureterovesicle junctions bilaterally with mild right and mild-to-moderate left hydronephrosis.  The hydronephrosis is new since 8/31/2016.    Creatinine  5/12/2021: 2.10 (GFR 39)  4/1/2019: 3.05  3/19/2021: 3.30  3/12/2021: 4.28  3/8/2021: 5.41  3/7/2021: 6.88  3/6/2021: 7.19  3/5/2021: 9.45  12/2/2020: 1.47  11/25/2020: 1.27         Past Medical History  HBP (high blood pressure); High blood pressure; Prostate Disorder         Past Surgical History  *I have had no surgeries         Medication List  metoprolol succinate 50 mg oral tablet extended release 24 hr         Allergy List  NO KNOWN DRUG ALLERGIES; PENICILLINS       Allergies Reconciled  Family Medical History  Diabetes, unspecified type; Renal Calculus; Diabetes         Social History  Alcohol (Never); ; Single; Tobacco (Former)         Review of Systems  · Constitutional  o Denies  o : fever, chills  · Gastrointestinal  o Denies  o : nausea, vomiting      Vitals  Date Time BP Position Site L\R Cuff Size HR RR TEMP (F) WT  HT  BMI kg/m2 BSA m2 O2 Sat FR L/min FiO2 HC       05/12/2021 10:25 AM       16   5'  10\"               Physical Examination  · Constitutional  o Appearance  o : Well nourished, well developed, thin, patient in no acute distress.  · Eyes  o Conjunctivae  o : Conjunctivae normal  o Sclerae  o : Sclerae white  · Ears, Nose, Mouth and Throat  o Ears  o :   § Hearing  § : Intact to conversational voice both ears  § Ears  § : Normal  o Nose  o :   § External Nose  § : Appearance normal  · Skin and " Subcutaneous Tissue  o General Inspection  o : No rashes, lesions, or areas of discoloration present  o General Palpation  o : Skin Turgor Normal  · Neurologic  o Mental Status Examination  o :   § Orientation  § : Alert and Oriented X3  o Gait and Station  o :   § Gait Screening  § : Ambulating without difficulty  · Psychiatric  o Mood and Affect  o : Mood normal, affect appropriate              Assessment  · LYDIA (acute kidney injury)     584.9/N17.9  · Urinary retention     788.20/R33.9  · Hydronephrosis     591/N13.30    Problems Reconciled  Plan  · Orders  o BMP Non-fasting [facility] ([id number]) - 584.9/N17.9, 591/N13.30 - 06/12/2021  o Insertion of indwelling Jennings catheter ([id number]) - 584.9/N17.9, 788.20/R33.9, 591/N13.30 - 05/12/2021  · Medications  o Medications have been Reconciled  o Transition of Care or Provider Policy  · Instructions  o Electronically Identified Patient Education Materials Provided Electronically     Lab reviewed, CBC reviewed; hemoglobin 11.6 from 9.1.  Renal function continues to improve; creatinine to 2 from 3.05 from 3.3 from 4.28; uncertain if 2 is the sarah and new baseline creatinine as patient currently maximally decompressed    Discussed again that uncertain whether he will eventually have recovery of bladder function or will warrant prolonged decompression.   Will warrant UDS once LYDIA and hydronephrosis have resolved    Will warrant subsequent imaging and work-up but; given the initial dilation of  collecting system bilaterally, believe that he may always have some mild persistent hydronephrosis; could consider MAG3 Lasix scan at some point to rule out obstructive uropathy    Plan for initiation of CIC at some point; do not recommend stent removal until confirming creatinine sarah.      Had discussion with patient regarding plan to initiate intermittent catheterization versus exchange of Jennings catheter today.  Aware that he will have to self cath up to 6 times a day thus requiring 6  catheters daily.  Given cost restraints for intermittent self cath; Jennings catheter exchanged today.   Plans to investigate health insurance options prior to next appointment.    Discussed again that Jennings catheter and stents are ensuring maximum drainage and decompression of the upper tracts.  Continue Jennings catheter and stents at this time    Encouraged continued follow-up with nephrology     Follow-up 1 month with BMP prior; will consider stent removal and initiation of CIC if creatinine has nadired   All question addressed      MDM low: 2 stable chronic illnesses; reviewed labs; ordered labs             Electronically Signed by: Emily Miller MD -Author on May 12, 2021 02:31:26 PM

## 2021-06-14 NOTE — PROGRESS NOTES
Patient was to obtain BMP prior to today's visit; BMP performed this morning; not resulted at appointment time.  Discussed; rescheduled visit for later this week for presumed cystoscopy once BMP resulted        Signed:  Emily Love MD  06/14/21  15:57 EDT

## 2021-06-15 ENCOUNTER — OFFICE VISIT (OUTPATIENT)
Dept: UROLOGY | Facility: CLINIC | Age: 36
End: 2021-06-15

## 2021-06-15 ENCOUNTER — LAB (OUTPATIENT)
Dept: LAB | Facility: HOSPITAL | Age: 36
End: 2021-06-15

## 2021-06-15 ENCOUNTER — TRANSCRIBE ORDERS (OUTPATIENT)
Dept: ADMINISTRATIVE | Facility: HOSPITAL | Age: 36
End: 2021-06-15

## 2021-06-15 VITALS — BODY MASS INDEX: 22.22 KG/M2 | HEIGHT: 70 IN | RESPIRATION RATE: 12 BRPM | WEIGHT: 155.2 LBS

## 2021-06-15 DIAGNOSIS — N17.9 ACUTE RENAL FAILURE, UNSPECIFIED ACUTE RENAL FAILURE TYPE (HCC): Primary | ICD-10-CM

## 2021-06-15 DIAGNOSIS — N17.9 ACUTE RENAL FAILURE, UNSPECIFIED ACUTE RENAL FAILURE TYPE (HCC): ICD-10-CM

## 2021-06-15 DIAGNOSIS — N42.9 PROSTATE DISORDER: ICD-10-CM

## 2021-06-15 DIAGNOSIS — N13.30 HYDRONEPHROSIS, UNSPECIFIED HYDRONEPHROSIS TYPE: ICD-10-CM

## 2021-06-15 PROBLEM — F41.9 ANXIETY: Status: ACTIVE | Noted: 2021-06-15

## 2021-06-15 PROBLEM — J30.2 SEASONAL ALLERGIC RHINITIS: Status: ACTIVE | Noted: 2021-06-15

## 2021-06-15 PROBLEM — I10 BENIGN ESSENTIAL HYPERTENSION: Status: ACTIVE | Noted: 2021-06-15

## 2021-06-15 PROBLEM — I10 HYPERTENSION: Status: ACTIVE | Noted: 2021-06-15

## 2021-06-15 LAB
ANION GAP SERPL CALCULATED.3IONS-SCNC: 8.6 MMOL/L (ref 5–15)
BUN SERPL-MCNC: 14 MG/DL (ref 6–20)
BUN/CREAT SERPL: 6.6 (ref 7–25)
CALCIUM SPEC-SCNC: 9.2 MG/DL (ref 8.6–10.5)
CHLORIDE SERPL-SCNC: 104 MMOL/L (ref 98–107)
CO2 SERPL-SCNC: 25.4 MMOL/L (ref 22–29)
CREAT SERPL-MCNC: 2.12 MG/DL (ref 0.76–1.27)
GFR SERPL CREATININE-BSD FRML MDRD: 35 ML/MIN/1.73
GLUCOSE SERPL-MCNC: 89 MG/DL (ref 65–99)
POTASSIUM SERPL-SCNC: 3.9 MMOL/L (ref 3.5–5.2)
SODIUM SERPL-SCNC: 138 MMOL/L (ref 136–145)

## 2021-06-15 PROCEDURE — 36415 COLL VENOUS BLD VENIPUNCTURE: CPT

## 2021-06-15 PROCEDURE — 80048 BASIC METABOLIC PNL TOTAL CA: CPT

## 2021-06-17 ENCOUNTER — OFFICE VISIT (OUTPATIENT)
Dept: UROLOGY | Facility: CLINIC | Age: 36
End: 2021-06-17

## 2021-06-17 VITALS — WEIGHT: 154.2 LBS | BODY MASS INDEX: 22.07 KG/M2 | RESPIRATION RATE: 13 BRPM | HEIGHT: 70 IN

## 2021-06-17 DIAGNOSIS — N13.30 HYDRONEPHROSIS, UNSPECIFIED HYDRONEPHROSIS TYPE: ICD-10-CM

## 2021-06-17 DIAGNOSIS — N18.30 STAGE 3 CHRONIC KIDNEY DISEASE, UNSPECIFIED WHETHER STAGE 3A OR 3B CKD (HCC): ICD-10-CM

## 2021-06-17 DIAGNOSIS — N31.9 NEUROGENIC BLADDER: ICD-10-CM

## 2021-06-17 DIAGNOSIS — R30.0 DYSURIA: Primary | ICD-10-CM

## 2021-06-17 PROBLEM — N18.9 CKD (CHRONIC KIDNEY DISEASE): Status: ACTIVE | Noted: 2021-06-17

## 2021-06-17 PROCEDURE — 52310 CYSTOSCOPY AND TREATMENT: CPT | Performed by: UROLOGY

## 2021-06-17 RX ORDER — SULFAMETHOXAZOLE AND TRIMETHOPRIM 800; 160 MG/1; MG/1
1 TABLET ORAL 2 TIMES DAILY
Qty: 6 TABLET | Refills: 0 | Status: SHIPPED | OUTPATIENT
Start: 2021-06-17 | End: 2021-07-14

## 2021-07-14 ENCOUNTER — OFFICE VISIT (OUTPATIENT)
Dept: FAMILY MEDICINE CLINIC | Facility: CLINIC | Age: 36
End: 2021-07-14

## 2021-07-14 VITALS
HEART RATE: 79 BPM | DIASTOLIC BLOOD PRESSURE: 72 MMHG | WEIGHT: 153.2 LBS | SYSTOLIC BLOOD PRESSURE: 130 MMHG | OXYGEN SATURATION: 97 % | BODY MASS INDEX: 22.69 KG/M2 | HEIGHT: 69 IN | TEMPERATURE: 97.5 F

## 2021-07-14 DIAGNOSIS — Z13.220 SCREENING CHOLESTEROL LEVEL: ICD-10-CM

## 2021-07-14 DIAGNOSIS — N18.9 CHRONIC KIDNEY DISEASE, UNSPECIFIED CKD STAGE: ICD-10-CM

## 2021-07-14 DIAGNOSIS — Z00.00 HEALTHCARE MAINTENANCE: Primary | ICD-10-CM

## 2021-07-14 DIAGNOSIS — Z11.59 NEED FOR HEPATITIS C SCREENING TEST: ICD-10-CM

## 2021-07-14 LAB
ALBUMIN SERPL-MCNC: 4.8 G/DL (ref 3.5–5.2)
ALBUMIN/GLOB SERPL: 1.9 G/DL
ALP SERPL-CCNC: 84 U/L (ref 39–117)
ALT SERPL W P-5'-P-CCNC: 15 U/L (ref 1–41)
ANION GAP SERPL CALCULATED.3IONS-SCNC: 11.2 MMOL/L (ref 5–15)
AST SERPL-CCNC: 21 U/L (ref 1–40)
BASOPHILS # BLD AUTO: 0.09 10*3/MM3 (ref 0–0.2)
BASOPHILS NFR BLD AUTO: 1.8 % (ref 0–1.5)
BILIRUB SERPL-MCNC: <0.2 MG/DL (ref 0–1.2)
BILIRUB UR QL STRIP: NEGATIVE
BUN SERPL-MCNC: 14 MG/DL (ref 6–20)
BUN/CREAT SERPL: 7.7 (ref 7–25)
CALCIUM SPEC-SCNC: 9.7 MG/DL (ref 8.6–10.5)
CHLORIDE SERPL-SCNC: 104 MMOL/L (ref 98–107)
CHOLEST SERPL-MCNC: 193 MG/DL (ref 0–200)
CLARITY UR: CLEAR
CO2 SERPL-SCNC: 24.8 MMOL/L (ref 22–29)
COLOR UR: YELLOW
CREAT SERPL-MCNC: 1.81 MG/DL (ref 0.76–1.27)
DEPRECATED RDW RBC AUTO: 40.1 FL (ref 37–54)
EOSINOPHIL # BLD AUTO: 0.27 10*3/MM3 (ref 0–0.4)
EOSINOPHIL NFR BLD AUTO: 5.3 % (ref 0.3–6.2)
ERYTHROCYTE [DISTWIDTH] IN BLOOD BY AUTOMATED COUNT: 11.7 % (ref 12.3–15.4)
GFR SERPL CREATININE-BSD FRML MDRD: 42 ML/MIN/1.73
GLOBULIN UR ELPH-MCNC: 2.5 GM/DL
GLUCOSE SERPL-MCNC: 73 MG/DL (ref 65–99)
GLUCOSE UR STRIP-MCNC: NEGATIVE MG/DL
HCT VFR BLD AUTO: 43.4 % (ref 37.5–51)
HCV AB SER DONR QL: NORMAL
HDLC SERPL-MCNC: 36 MG/DL (ref 40–60)
HGB BLD-MCNC: 14.4 G/DL (ref 13–17.7)
HGB UR QL STRIP.AUTO: NEGATIVE
IMM GRANULOCYTES # BLD AUTO: 0.01 10*3/MM3 (ref 0–0.05)
IMM GRANULOCYTES NFR BLD AUTO: 0.2 % (ref 0–0.5)
KETONES UR QL STRIP: NEGATIVE
LDLC SERPL CALC-MCNC: 125 MG/DL (ref 0–100)
LDLC/HDLC SERPL: 3.37 {RATIO}
LEUKOCYTE ESTERASE UR QL STRIP.AUTO: NEGATIVE
LYMPHOCYTES # BLD AUTO: 1.63 10*3/MM3 (ref 0.7–3.1)
LYMPHOCYTES NFR BLD AUTO: 32.1 % (ref 19.6–45.3)
MCH RBC QN AUTO: 31.1 PG (ref 26.6–33)
MCHC RBC AUTO-ENTMCNC: 33.2 G/DL (ref 31.5–35.7)
MCV RBC AUTO: 93.7 FL (ref 79–97)
MONOCYTES # BLD AUTO: 0.55 10*3/MM3 (ref 0.1–0.9)
MONOCYTES NFR BLD AUTO: 10.8 % (ref 5–12)
NEUTROPHILS NFR BLD AUTO: 2.53 10*3/MM3 (ref 1.7–7)
NEUTROPHILS NFR BLD AUTO: 49.8 % (ref 42.7–76)
NITRITE UR QL STRIP: NEGATIVE
NRBC BLD AUTO-RTO: 0 /100 WBC (ref 0–0.2)
PH UR STRIP.AUTO: 6 [PH] (ref 5–8)
PLATELET # BLD AUTO: 270 10*3/MM3 (ref 140–450)
PMV BLD AUTO: 9.8 FL (ref 6–12)
POTASSIUM SERPL-SCNC: 4.3 MMOL/L (ref 3.5–5.2)
PROT SERPL-MCNC: 7.3 G/DL (ref 6–8.5)
PROT UR QL STRIP: NEGATIVE
RBC # BLD AUTO: 4.63 10*6/MM3 (ref 4.14–5.8)
SODIUM SERPL-SCNC: 140 MMOL/L (ref 136–145)
SP GR UR STRIP: 1.01 (ref 1–1.03)
TRIGL SERPL-MCNC: 179 MG/DL (ref 0–150)
TSH SERPL DL<=0.05 MIU/L-ACNC: 1.81 UIU/ML (ref 0.27–4.2)
UROBILINOGEN UR QL STRIP: NORMAL
VLDLC SERPL-MCNC: 32 MG/DL (ref 5–40)
WBC # BLD AUTO: 5.08 10*3/MM3 (ref 3.4–10.8)

## 2021-07-14 PROCEDURE — 36415 COLL VENOUS BLD VENIPUNCTURE: CPT | Performed by: NURSE PRACTITIONER

## 2021-07-14 PROCEDURE — 86803 HEPATITIS C AB TEST: CPT | Performed by: NURSE PRACTITIONER

## 2021-07-14 PROCEDURE — 80053 COMPREHEN METABOLIC PANEL: CPT | Performed by: NURSE PRACTITIONER

## 2021-07-14 PROCEDURE — 80061 LIPID PANEL: CPT | Performed by: NURSE PRACTITIONER

## 2021-07-14 PROCEDURE — 99395 PREV VISIT EST AGE 18-39: CPT | Performed by: NURSE PRACTITIONER

## 2021-07-14 PROCEDURE — 81003 URINALYSIS AUTO W/O SCOPE: CPT | Performed by: NURSE PRACTITIONER

## 2021-07-14 PROCEDURE — 84443 ASSAY THYROID STIM HORMONE: CPT | Performed by: NURSE PRACTITIONER

## 2021-07-14 PROCEDURE — 85025 COMPLETE CBC W/AUTO DIFF WBC: CPT | Performed by: NURSE PRACTITIONER

## 2021-07-14 NOTE — PROGRESS NOTES
"Chief Complaint  Annual Exam (fasting labs)    PHQ-2 Total Score: 0    Subjective        Past Medical History:   Diagnosis Date   • HBP (high blood pressure)    • Prostate disorder      Social History     Tobacco Use   • Smoking status: Former Smoker     Packs/day: 0.50     Start date: 1998   • Smokeless tobacco: Never Used   Vaping Use   • Vaping Use: Never used   Substance Use Topics   • Alcohol use: Never   • Drug use: Never      Current Outpatient Medications on File Prior to Visit   Medication Sig   • [DISCONTINUED] sulfamethoxazole-trimethoprim (Bactrim DS) 800-160 MG per tablet Take 1 tablet by mouth 2 (Two) Times a Day.     No current facility-administered medications on file prior to visit.      Allergies   Allergen Reactions   • Penicillins Hives      Health Maintenance Due   Topic Date Due   • COVID-19 Vaccine (1) Never done   • TDAP/TD VACCINES (2 - Tdap) 10/19/2010   • HEPATITIS C SCREENING  Never done      Rick Weems presents to [facility]  Here for wellness physical and fasting labs    Pt states his bladder is no longer functioning and he is self-cathing about 6-8 times per day - states his urine has previously backed up and caused kidney failure but numbers have been stable at Stage 3 level     Physical active: swim, basketball, walking - currently working but not as much as before due to decreased energy/stamina, working at [employer]    Diet: tries to eat low sodium and low potassium and eat healthy choices    Denies family hx of colon cancer    Denies family hx of prostate cancer          Objective   Vital Signs:   /72   Pulse 79   Temp 97.5 °F (36.4 °C)   Ht 175.3 cm (69\")   Wt 69.5 kg (153 lb 3.2 oz)   SpO2 97%   BMI 22.62 kg/m²     Review of Systems   Constitutional: Negative for unexpected weight gain and unexpected weight loss.   Respiratory: Negative for cough, shortness of breath and wheezing.    Cardiovascular: Negative for chest pain and " palpitations.   Gastrointestinal: Negative for abdominal pain, diarrhea, nausea and vomiting.   Endocrine: Negative for polydipsia and polyphagia.   Musculoskeletal: Negative for back pain.   Skin: Negative for dry skin.   Neurological: Negative for dizziness, weakness, numbness and headache.      Physical Exam  Vitals reviewed.   Constitutional:       General: He is not in acute distress.     Appearance: Normal appearance. He is well-developed.   HENT:      Head: Normocephalic and atraumatic.      Right Ear: External ear normal.      Left Ear: External ear normal.   Eyes:      Conjunctiva/sclera: Conjunctivae normal.      Pupils: Pupils are equal, round, and reactive to light.   Cardiovascular:      Rate and Rhythm: Normal rate and regular rhythm.      Heart sounds: No murmur heard.     Pulmonary:      Effort: Pulmonary effort is normal.      Breath sounds: Normal breath sounds. No wheezing or rhonchi.   Musculoskeletal:      Cervical back: Neck supple.      Right lower leg: No edema.      Left lower leg: No edema.   Skin:     General: Skin is warm and dry.   Neurological:      Mental Status: He is alert and oriented to person, place, and time.      Cranial Nerves: No cranial nerve deficit.   Psychiatric:         Mood and Affect: Mood and affect normal.         Behavior: Behavior normal.         Thought Content: Thought content normal.         Judgment: Judgment normal.        Result Review :   The following data was reviewed by: KWAME Boggs on 07/14/2021:  CMP    CMP 4/1/21 5/12/21 6/15/21   Glucose   89   Glucose 98 95    BUN 25 13 14   Creatinine 3.05 (A) 2.10 (A) 2.12 (A)   eGFR Non African Am   35 (A)   Sodium 140 140 138   Potassium 4.5 4.1 3.9   Chloride 104 106 104   Calcium 9.7 9.2 9.2   Albumin 4.2     Total Bilirubin <0.15 (A)     Alkaline Phosphatase 71     AST (SGOT) 23     ALT (SGPT) 25     (A) Abnormal value       Comments are available for some flowsheets but are not being displayed.                    Assessment and Plan    Diagnoses and all orders for this visit:    1. Healthcare maintenance (Primary)  -     CBC & Differential  -     Comprehensive Metabolic Panel  -     TSH Rfx On Abnormal To Free T4  -     Urinalysis With Culture If Indicated - Urine, Clean Catch    2. Need for hepatitis C screening test  -     Hepatitis C Antibody    3. Screening cholesterol level  -     Lipid Panel    4. Chronic kidney disease, unspecified CKD stage  -     Comprehensive Metabolic Panel  -     Urinalysis With Culture If Indicated - Urine, Clean Catch        Follow Up   Return in about 1 year (around 7/14/2022) for Annual physical.  Patient was given instructions and counseling regarding his condition or for health maintenance advice. Please see specific information pulled into the AVS if appropriate.

## 2021-07-14 NOTE — PROGRESS NOTES
Venipuncture Blood Specimen Collection  Venipuncture performed in LEFT ARM  by Roxanna Sanchez with good hemostasis. Patient tolerated the procedure well without complications.   07/14/21   Roxanna Sanchez

## 2021-07-15 VITALS — BODY MASS INDEX: 22.02 KG/M2 | RESPIRATION RATE: 16 BRPM | HEIGHT: 70 IN

## 2021-07-20 ENCOUNTER — APPOINTMENT (OUTPATIENT)
Dept: ULTRASOUND IMAGING | Facility: HOSPITAL | Age: 36
End: 2021-07-20

## 2021-07-20 ENCOUNTER — OFFICE VISIT (OUTPATIENT)
Dept: UROLOGY | Facility: CLINIC | Age: 36
End: 2021-07-20

## 2021-07-20 VITALS — HEIGHT: 70 IN | BODY MASS INDEX: 21.82 KG/M2 | RESPIRATION RATE: 18 BRPM | WEIGHT: 152.4 LBS

## 2021-07-20 DIAGNOSIS — N18.9 CHRONIC KIDNEY DISEASE, UNSPECIFIED CKD STAGE: ICD-10-CM

## 2021-07-20 DIAGNOSIS — N13.30 HYDRONEPHROSIS, UNSPECIFIED HYDRONEPHROSIS TYPE: ICD-10-CM

## 2021-07-20 DIAGNOSIS — N31.9 NEUROGENIC BLADDER: Primary | ICD-10-CM

## 2021-07-20 PROCEDURE — 99212 OFFICE O/P EST SF 10 MIN: CPT | Performed by: UROLOGY

## 2022-03-22 ENCOUNTER — TELEPHONE (OUTPATIENT)
Dept: UROLOGY | Facility: CLINIC | Age: 37
End: 2022-03-22

## 2022-03-22 NOTE — TELEPHONE ENCOUNTER
----- Message from Genet Swanson sent at 3/22/2022 10:34 AM EDT -----  Regarding: please call pt  Please call pt and remind him that Dr Love wanted him to follow up with a BMP in mid January. The order is in and he can go to any [facility] outpatient lab to have done. Thanks!!

## 2022-03-22 NOTE — TELEPHONE ENCOUNTER
Patient left vmom to remind him to get BMP as him and Dr. Hernandez have discussed. Details to call back with any questions.

## 2022-07-17 ENCOUNTER — APPOINTMENT (OUTPATIENT)
Dept: CT IMAGING | Facility: HOSPITAL | Age: 37
End: 2022-07-17

## 2022-07-17 ENCOUNTER — HOSPITAL ENCOUNTER (EMERGENCY)
Facility: HOSPITAL | Age: 37
Discharge: HOME | End: 2022-07-17
Attending: EMERGENCY MEDICINE | Admitting: EMERGENCY MEDICINE

## 2022-07-17 VITALS
OXYGEN SATURATION: 100 % | DIASTOLIC BLOOD PRESSURE: 86 MMHG | HEIGHT: 70 IN | RESPIRATION RATE: 14 BRPM | TEMPERATURE: 98.7 F | BODY MASS INDEX: 22.63 KG/M2 | HEART RATE: 65 BPM | WEIGHT: 158.07 LBS | SYSTOLIC BLOOD PRESSURE: 140 MMHG

## 2022-07-17 DIAGNOSIS — R10.9 RIGHT FLANK PAIN: Primary | ICD-10-CM

## 2022-07-17 DIAGNOSIS — R11.0 NAUSEA: ICD-10-CM

## 2022-07-17 LAB
ALBUMIN SERPL-MCNC: 5 G/DL (ref 3.5–5.2)
ALBUMIN/GLOB SERPL: 1.9 G/DL
ALP SERPL-CCNC: 77 U/L (ref 39–117)
ALT SERPL W P-5'-P-CCNC: 18 U/L (ref 1–41)
ANION GAP SERPL CALCULATED.3IONS-SCNC: 11 MMOL/L (ref 5–15)
AST SERPL-CCNC: 23 U/L (ref 1–40)
BASOPHILS # BLD AUTO: 0.11 10*3/MM3 (ref 0–0.2)
BASOPHILS NFR BLD AUTO: 1.2 % (ref 0–1.5)
BILIRUB SERPL-MCNC: 0.2 MG/DL (ref 0–1.2)
BILIRUB UR QL STRIP: NEGATIVE
BUN SERPL-MCNC: 14 MG/DL (ref 6–20)
BUN/CREAT SERPL: 8.4 (ref 7–25)
CALCIUM SPEC-SCNC: 10.5 MG/DL (ref 8.6–10.5)
CHLORIDE SERPL-SCNC: 100 MMOL/L (ref 98–107)
CLARITY UR: CLEAR
CO2 SERPL-SCNC: 26 MMOL/L (ref 22–29)
COLOR UR: YELLOW
CREAT SERPL-MCNC: 1.67 MG/DL (ref 0.76–1.27)
DEPRECATED RDW RBC AUTO: 39.1 FL (ref 37–54)
EGFRCR SERPLBLD CKD-EPI 2021: 53.4 ML/MIN/1.73
EOSINOPHIL # BLD AUTO: 0.11 10*3/MM3 (ref 0–0.4)
EOSINOPHIL NFR BLD AUTO: 1.2 % (ref 0.3–6.2)
ERYTHROCYTE [DISTWIDTH] IN BLOOD BY AUTOMATED COUNT: 11.9 % (ref 12.3–15.4)
GLOBULIN UR ELPH-MCNC: 2.6 GM/DL
GLUCOSE SERPL-MCNC: 107 MG/DL (ref 65–99)
GLUCOSE UR STRIP-MCNC: NEGATIVE MG/DL
HCT VFR BLD AUTO: 42.6 % (ref 37.5–51)
HGB BLD-MCNC: 14.7 G/DL (ref 13–17.7)
HGB UR QL STRIP.AUTO: NEGATIVE
HOLD SPECIMEN: NORMAL
HOLD SPECIMEN: NORMAL
IMM GRANULOCYTES # BLD AUTO: 0.02 10*3/MM3 (ref 0–0.05)
IMM GRANULOCYTES NFR BLD AUTO: 0.2 % (ref 0–0.5)
KETONES UR QL STRIP: NEGATIVE
LEUKOCYTE ESTERASE UR QL STRIP.AUTO: NEGATIVE
LIPASE SERPL-CCNC: 72 U/L (ref 13–60)
LYMPHOCYTES # BLD AUTO: 1.65 10*3/MM3 (ref 0.7–3.1)
LYMPHOCYTES NFR BLD AUTO: 17.7 % (ref 19.6–45.3)
MCH RBC QN AUTO: 31.1 PG (ref 26.6–33)
MCHC RBC AUTO-ENTMCNC: 34.5 G/DL (ref 31.5–35.7)
MCV RBC AUTO: 90.3 FL (ref 79–97)
MONOCYTES # BLD AUTO: 0.51 10*3/MM3 (ref 0.1–0.9)
MONOCYTES NFR BLD AUTO: 5.5 % (ref 5–12)
NEUTROPHILS NFR BLD AUTO: 6.91 10*3/MM3 (ref 1.7–7)
NEUTROPHILS NFR BLD AUTO: 74.2 % (ref 42.7–76)
NITRITE UR QL STRIP: NEGATIVE
NRBC BLD AUTO-RTO: 0 /100 WBC (ref 0–0.2)
PH UR STRIP.AUTO: 7 [PH] (ref 5–8)
PLATELET # BLD AUTO: 280 10*3/MM3 (ref 140–450)
PMV BLD AUTO: 8.9 FL (ref 6–12)
POTASSIUM SERPL-SCNC: 3.8 MMOL/L (ref 3.5–5.2)
PROT SERPL-MCNC: 7.6 G/DL (ref 6–8.5)
PROT UR QL STRIP: NEGATIVE
RBC # BLD AUTO: 4.72 10*6/MM3 (ref 4.14–5.8)
SODIUM SERPL-SCNC: 137 MMOL/L (ref 136–145)
SP GR UR STRIP: 1.01 (ref 1–1.03)
UROBILINOGEN UR QL STRIP: NORMAL
WBC NRBC COR # BLD: 9.31 10*3/MM3 (ref 3.4–10.8)
WHOLE BLOOD HOLD COAG: NORMAL
WHOLE BLOOD HOLD SPECIMEN: NORMAL

## 2022-07-17 PROCEDURE — 99283 EMERGENCY DEPT VISIT LOW MDM: CPT

## 2022-07-17 PROCEDURE — 25010000002 KETOROLAC TROMETHAMINE PER 15 MG: Performed by: EMERGENCY MEDICINE

## 2022-07-17 PROCEDURE — 85025 COMPLETE CBC W/AUTO DIFF WBC: CPT | Performed by: EMERGENCY MEDICINE

## 2022-07-17 PROCEDURE — 80053 COMPREHEN METABOLIC PANEL: CPT | Performed by: EMERGENCY MEDICINE

## 2022-07-17 PROCEDURE — 74176 CT ABD & PELVIS W/O CONTRAST: CPT

## 2022-07-17 PROCEDURE — 81003 URINALYSIS AUTO W/O SCOPE: CPT | Performed by: EMERGENCY MEDICINE

## 2022-07-17 PROCEDURE — 96374 THER/PROPH/DIAG INJ IV PUSH: CPT

## 2022-07-17 PROCEDURE — 83690 ASSAY OF LIPASE: CPT | Performed by: EMERGENCY MEDICINE

## 2022-07-17 RX ORDER — KETOROLAC TROMETHAMINE 30 MG/ML
30 INJECTION, SOLUTION INTRAMUSCULAR; INTRAVENOUS ONCE
Status: COMPLETED | OUTPATIENT
Start: 2022-07-17 | End: 2022-07-17

## 2022-07-17 RX ORDER — SODIUM CHLORIDE 0.9 % (FLUSH) 0.9 %
10 SYRINGE (ML) INJECTION AS NEEDED
Status: DISCONTINUED | OUTPATIENT
Start: 2022-07-17 | End: 2022-07-17 | Stop reason: HOSPADM

## 2022-07-17 RX ORDER — ONDANSETRON 8 MG/1
8 TABLET, ORALLY DISINTEGRATING ORAL 4 TIMES DAILY PRN
Qty: 20 TABLET | Refills: 0 | Status: SHIPPED | OUTPATIENT
Start: 2022-07-17

## 2022-07-17 RX ADMIN — KETOROLAC TROMETHAMINE 30 MG: 30 INJECTION, SOLUTION INTRAMUSCULAR; INTRAVENOUS at 14:11

## 2022-07-17 NOTE — DISCHARGE INSTRUCTIONS
Activity as tolerated.  Ensure adequate hydration and nutrition.  Take prescription as needed as prescribed.  Follow-up to primary care provider as needed.  Return to ER for temperature greater than 101, increasing pain, intractable vomiting, or any other concerns issues that may arise.

## 2022-07-17 NOTE — ED PROVIDER NOTES
Time: 1:42 PM EDT  Arrived by: private car  Chief Complaint: Urinary Urgency  History provided by: patient  History is limited by: N/A     History of Present Illness:  Patient is a 36 y.o.  male that presents to the emergency department with back pain in the right flank area. The patient reports the pain as a constant dull pain and when he is relaxing the pain eases. He also has a catheter and uses it to urinate around 6 to 8 times a day. He also has a history of kidney failure. The pt denies fever and kidney stone.     HPI    Patient Care Team  Primary Care Provider: Garfield Viveros MD    Past Medical History:     Allergies   Allergen Reactions   • Penicillins Hives     Past Medical History:   Diagnosis Date   • HBP (high blood pressure)    • Prostate disorder      Past Surgical History:   Procedure Laterality Date   • RENAL ARTERY STENT       Family History   Problem Relation Age of Onset   • Diabetes Father    • Nephrolithiasis Father        Home Medications:  Prior to Admission medications    Not on File        Social History:   Social History     Tobacco Use   • Smoking status: Former Smoker     Packs/day: 0.50     Start date: 1998   • Smokeless tobacco: Never Used   Vaping Use   • Vaping Use: Never used   Substance Use Topics   • Alcohol use: Never   • Drug use: Never       Review of Systems:  Review of Systems   Constitutional: Negative for chills and fever.   HENT: Negative for congestion, ear pain and sore throat.    Eyes: Negative for pain.   Respiratory: Negative for cough, chest tightness and shortness of breath.    Cardiovascular: Negative for chest pain.   Gastrointestinal: Negative for abdominal pain, diarrhea, nausea and vomiting.   Genitourinary: Positive for flank pain and urgency. Negative for hematuria.   Musculoskeletal: Positive for back pain (right flank area). Negative for joint swelling.   Skin: Negative for pallor.   Neurological: Negative for seizures and headaches.   All other  "systems reviewed and are negative.         Physical Exam:  /82   Pulse 67   Temp 98.7 °F (37.1 °C) (Oral)   Resp 14   Ht 177.8 cm (70\")   Wt 71.7 kg (158 lb 1.1 oz)   SpO2 100%   BMI 22.68 kg/m²     Physical Exam  Vitals and nursing note reviewed.   Constitutional:       General: He is not in acute distress.     Appearance: Normal appearance. He is not toxic-appearing.   HENT:      Head: Normocephalic and atraumatic.      Mouth/Throat:      Mouth: Mucous membranes are moist.   Eyes:      General: No scleral icterus.  Cardiovascular:      Rate and Rhythm: Normal rate and regular rhythm.      Pulses: Normal pulses.      Heart sounds: Normal heart sounds.   Pulmonary:      Effort: Pulmonary effort is normal. No respiratory distress.      Breath sounds: Normal breath sounds.   Abdominal:      General: Abdomen is flat.      Palpations: Abdomen is soft.      Tenderness: There is no abdominal tenderness.   Musculoskeletal:         General: Normal range of motion.      Cervical back: Normal range of motion and neck supple.      Comments: No CVA tenderness   Skin:     General: Skin is warm and dry.   Neurological:      Mental Status: He is alert and oriented to person, place, and time. Mental status is at baseline.                Medications in the Emergency Department:  Medications   sodium chloride 0.9 % flush 10 mL (has no administration in time range)   ketorolac (TORADOL) injection 30 mg (30 mg Intravenous Given 7/17/22 1411)        Labs  Lab Results (last 24 hours)     Procedure Component Value Units Date/Time    Urinalysis With Microscopic If Indicated (No Culture) - Urine, Clean Catch [[id number]]  (Normal) Collected: 07/17/22 1204    Specimen: Urine, Clean Catch Updated: 07/17/22 1226     Color, UA Yellow     Appearance, UA Clear     pH, UA 7.0     Specific Gravity, UA 1.006     Glucose, UA Negative     Ketones, UA Negative     Bilirubin, UA Negative     Blood, UA Negative     Protein, UA Negative     " Leuk Esterase, UA Negative     Nitrite, UA Negative     Urobilinogen, UA 0.2 E.U./dL    Narrative:      Urine microscopic not indicated.    CBC & Differential [[id number]]  (Abnormal) Collected: 07/17/22 1211    Specimen: Blood Updated: 07/17/22 1218    Narrative:      The following orders were created for panel order CBC & Differential.  Procedure                               Abnormality         Status                     ---------                               -----------         ------                     CBC Auto Differential[[id number]]        Abnormal            Final result                 Please view results for these tests on the individual orders.    Comprehensive Metabolic Panel [[id number]]  (Abnormal) Collected: 07/17/22 1211    Specimen: Blood Updated: 07/17/22 1236     Glucose 107 mg/dL      BUN 14 mg/dL      Creatinine 1.67 mg/dL      Sodium 137 mmol/L      Potassium 3.8 mmol/L      Chloride 100 mmol/L      CO2 26.0 mmol/L      Calcium 10.5 mg/dL      Total Protein 7.6 g/dL      Albumin 5.00 g/dL      ALT (SGPT) 18 U/L      AST (SGOT) 23 U/L      Alkaline Phosphatase 77 U/L      Total Bilirubin 0.2 mg/dL      Globulin 2.6 gm/dL      A/G Ratio 1.9 g/dL      BUN/Creatinine Ratio 8.4     Anion Gap 11.0 mmol/L      eGFR 53.4 mL/min/1.73      Comment: National Kidney Foundation and American Society of Nephrology (ASN) Task Force recommended calculation based on the Chronic Kidney Disease Epidemiology Collaboration (CKD-EPI) equation refit without adjustment for race.       Narrative:      GFR Normal >60  Chronic Kidney Disease <60  Kidney Failure <15      Lipase [[id number]]  (Abnormal) Collected: 07/17/22 1211    Specimen: Blood Updated: 07/17/22 1236     Lipase 72 U/L     CBC Auto Differential [[id number]]  (Abnormal) Collected: 07/17/22 1211    Specimen: Blood Updated: 07/17/22 1218     WBC 9.31 10*3/mm3      RBC 4.72 10*6/mm3      Hemoglobin 14.7 g/dL      Hematocrit 42.6 %      MCV 90.3 fL      MCH 31.1  pg      MCHC 34.5 g/dL      RDW 11.9 %      RDW-SD 39.1 fl      MPV 8.9 fL      Platelets 280 10*3/mm3      Neutrophil % 74.2 %      Lymphocyte % 17.7 %      Monocyte % 5.5 %      Eosinophil % 1.2 %      Basophil % 1.2 %      Immature Grans % 0.2 %      Neutrophils, Absolute 6.91 10*3/mm3      Lymphocytes, Absolute 1.65 10*3/mm3      Monocytes, Absolute 0.51 10*3/mm3      Eosinophils, Absolute 0.11 10*3/mm3      Basophils, Absolute 0.11 10*3/mm3      Immature Grans, Absolute 0.02 10*3/mm3      nRBC 0.0 /100 WBC            Imaging:  CT Abdomen Pelvis Without Contrast    Result Date: 7/17/2022  PROCEDURE: CT ABDOMEN PELVIS WO CONTRAST  COMPARISON: [place], CT, ABD PEL W/O CONTRAST, 3/15/2021, 13:20.  INDICATIONS: RIGHT FLANK PAIN. HISTORY OF STONES AND RENAL FAILURE.  TECHNIQUE: CT images were created without intravenous contrast.   PROTOCOL:   Standard imaging protocol performed    RADIATION:   DLP: 396mGy*cm   Automated exposure control was utilized to minimize radiation dose.  FINDINGS:  LIVER: Normal.  No enlargement, atrophy, abnormal density, or significant focal lesion.  BILIARY: Normal.  No visible dilatation or calcification.  PANCREAS: Normal.  No lesion, fluid collection, ductal dilatation, or atrophy.  SPLEEN: Normal.  No enlargement or focal lesion.  KIDNEYS: Normal.  No mass, obstruction, or calcification.  ADRENALS: Normal.  No mass or enlargement.  AORTA/VASCULAR: Normal.  No aneurysm.  RETROPERITONEUM: Normal.  No mass or adenopathy.  BOWEL/MESENTERY: Normal.  No visible mass, obstruction, or bowel wall thickening.  ABDOMINAL WALL: Normal.  No mass or hernia.  URINARY BLADDER: Mild circumferential thickening of the wall of the mild-moderately distended urinary bladder has improved since previous study performed on 3/15/2021 PELVIC NODES: Normal.  No adenopathy.  PELVIC ORGANS: Normal.  No visible mass.  Pelvic organs appropriate for patient age.  BONES: Normal.  No bony lesion or  fracture.  LUNG BASES: Normal.  No visible pulmonary or pleural disease.  OTHER: Negative.         1. Mild circumferential thickening of the wall of the mild-moderately distended urinary bladder has improved since previous study performed on 3/15/2021. 2. The right left kidneys and ureters are normal.     MEREDITH CARRENO MD       Electronically Signed and Approved By: MEREDITH CARRENO MD on 7/17/2022 at 16:57                EKG:      Procedures:  Procedures    Progress                      The patient was seen and evaluated the ED by me.  The above history and physical examination was performed as document.  Diagnostic data was obtained.  Results reviewed.  Discussed with the patient.  Patient's ED work-up is unremarkable.  Patient still for discharge home at this time.      Medical Decision Making:  MDM  Number of Diagnoses or Management Options  Diagnosis management comments: 17:07 EDT Updated patient on results and plan for discharge. Patient expressed understanding and agreement. All questions answered at this time..       Final diagnoses:   Right flank pain   Nausea        Disposition:  ED Disposition     ED Disposition   Discharge    Condition   Stable    Comment   --            Documentation assistance provided by Josiah Rai acting as scribe for Erik Vidales DO. Information recorded by the scribe was done at my direction and has been verified and validated by me.     .  Josiah Rai  07/17/22 1401       Josiah Rai  07/17/22 1409       Josiah Rai  07/17/22 1409       Josiah Rai  07/17/22 1414       Josiah Rai  07/17/22 1707       Erik Vidales DO  07/17/22 1721

## 2022-11-26 NOTE — PROGRESS NOTES
"    UROLOGY OFFICE FOLLOW-UP NOTE    Subjective   HPI  Rick Weems is a 35 y.o. male known to urology due to inpatient consultation for urinary retention, a LYDIA, and hydronephrosis.  He has past medical history significant for hypertension. Admitted 3/6/2021, 3 weeks prior to admission he reported progressively less urine output.  Also reported associated perineal discomfort; had to sit to void.  Patient was noted to be in retention of greater than 1 L upon catheter placement.  Found to have LYDIA of 9.25 which had decreased with catheter prior to admission to 5.41. UA was unremarkable. Retroperitoneal ultrasound indicates moderate to severe bilateral hydroureteronephrosis and debris-filled urinary bladder.     History of \"prostate issues;\" evaluated by urologist many years ago and was told that his prostate was \"swollen.\"  Reports baseline urinary hesitancy.       Presents today for follow-up; catheter in place since discharge.  Had CT scan and BMP prior to today's appointment.  Patient states he feels much better.  Denies significant bother with catheter.  Afraid of going on dialysis.  \"Just wants to get better.\"    Update 4/1/2021: Patient presents for follow-up after cystoscopy, bladder biopsy, bilateral ureteral stent placement.  Patient has catheter in place.  Denies significant bother with the catheter.  States he is feeling \"great.\"  Saw nephrology, found that somewhat discouraging as unknown how much his kidney function will recover.    Update 5/12/2021: Presents for follow-up with labs prior.  Patient states he feels great.  Has indwelling catheter and bilateral stents in place.  Hoping to be able to start intermittent cath.  States some generalized discomfort with indwelling cath but, remains motivated to \"do what is best for his kidneys.\"    Update 7/20/2021: Patient presents for follow-up withBMP prior.  Did not have renal ultrasound performed prior; states I cannot afford it.\"  Patient remains " self-pay.    Was able to obtain catheters at affordable rate; cathing up to 7 times a day.  States that his bladder is not working; does feel some bladder pressure prior to cathing but is essentially cathing on a timed basis.  Denies issues with cathing.  Not bothered by cathing.  Patient had bilateral ureteral stents removed and initiated intermittent self cath at last visit, 6/17/2021.    ___________  CT abdomen pelvis Noncon, 3/15/2021: Diffuse marked urinary bladder wall thickening likely secondary to cystitis.  Suggest correlation with cystoscopy. 2. Evidence of obstruction at the ureterovesicle junctions bilaterally with mild right and mild-to-moderate left hydronephrosis.  The hydronephrosis is new since 8/31/2016.    Creatinine  7/14/2021: 1.81 (GFR 42)  5/12/2021: 2.10 (GFR 39)  4/1/2019: 3.05  3/19/2021: 3.30  3/12/2021: 4.28  3/8/2021: 5.41  3/7/2021: 6.88  3/6/2021: 7.19  3/5/2021: 9.45  12/2/2020: 1.47  11/25/2020: 1.27      Results for orders placed or performed in visit on 07/14/21   Hepatitis C Antibody    Specimen: Blood   Result Value Ref Range    Hepatitis C Ab Non-Reactive Non-Reactive   Comprehensive Metabolic Panel    Specimen: Blood   Result Value Ref Range    Glucose 73 65 - 99 mg/dL    BUN 14 6 - 20 mg/dL    Creatinine 1.81 (H) 0.76 - 1.27 mg/dL    Sodium 140 136 - 145 mmol/L    Potassium 4.3 3.5 - 5.2 mmol/L    Chloride 104 98 - 107 mmol/L    CO2 24.8 22.0 - 29.0 mmol/L    Calcium 9.7 8.6 - 10.5 mg/dL    Total Protein 7.3 6.0 - 8.5 g/dL    Albumin 4.80 3.50 - 5.20 g/dL    ALT (SGPT) 15 1 - 41 U/L    AST (SGOT) 21 1 - 40 U/L    Alkaline Phosphatase 84 39 - 117 U/L    Total Bilirubin <0.2 0.0 - 1.2 mg/dL    eGFR Non African Amer 42 (L) >60 mL/min/1.73    Globulin 2.5 gm/dL    A/G Ratio 1.9 g/dL    BUN/Creatinine Ratio 7.7 7.0 - 25.0    Anion Gap 11.2 5.0 - 15.0 mmol/L   Lipid Panel    Specimen: Blood   Result Value Ref Range    Total Cholesterol 193 0 - 200 mg/dL    Triglycerides 179 (H) 0 -  150 mg/dL    HDL Cholesterol 36 (L) 40 - 60 mg/dL    LDL Cholesterol  125 (H) 0 - 100 mg/dL    VLDL Cholesterol 32 5 - 40 mg/dL    LDL/HDL Ratio 3.37    TSH Rfx On Abnormal To Free T4    Specimen: Blood   Result Value Ref Range    TSH 1.810 0.270 - 4.200 uIU/mL   Urinalysis With Culture If Indicated - Urine, Clean Catch    Specimen: Urine, Clean Catch   Result Value Ref Range    Color, UA Yellow Yellow, Straw    Appearance, UA Clear Clear    pH, UA 6.0 5.0 - 8.0    Specific Gravity, UA 1.014 1.005 - 1.030    Glucose, UA Negative Negative    Ketones, UA Negative Negative    Bilirubin, UA Negative Negative    Blood, UA Negative Negative    Protein, UA Negative Negative    Leuk Esterase, UA Negative Negative    Nitrite, UA Negative Negative    Urobilinogen, UA 0.2 E.U./dL 0.2 - 1.0 E.U./dL   CBC Auto Differential    Specimen: Blood   Result Value Ref Range    WBC 5.08 3.40 - 10.80 10*3/mm3    RBC 4.63 4.14 - 5.80 10*6/mm3    Hemoglobin 14.4 13.0 - 17.7 g/dL    Hematocrit 43.4 37.5 - 51.0 %    MCV 93.7 79.0 - 97.0 fL    MCH 31.1 26.6 - 33.0 pg    MCHC 33.2 31.5 - 35.7 g/dL    RDW 11.7 (L) 12.3 - 15.4 %    RDW-SD 40.1 37.0 - 54.0 fl    MPV 9.8 6.0 - 12.0 fL    Platelets 270 140 - 450 10*3/mm3    Neutrophil % 49.8 42.7 - 76.0 %    Lymphocyte % 32.1 19.6 - 45.3 %    Monocyte % 10.8 5.0 - 12.0 %    Eosinophil % 5.3 0.3 - 6.2 %    Basophil % 1.8 (H) 0.0 - 1.5 %    Immature Grans % 0.2 0.0 - 0.5 %    Neutrophils, Absolute 2.53 1.70 - 7.00 10*3/mm3    Lymphocytes, Absolute 1.63 0.70 - 3.10 10*3/mm3    Monocytes, Absolute 0.55 0.10 - 0.90 10*3/mm3    Eosinophils, Absolute 0.27 0.00 - 0.40 10*3/mm3    Basophils, Absolute 0.09 0.00 - 0.20 10*3/mm3    Immature Grans, Absolute 0.01 0.00 - 0.05 10*3/mm3    nRBC 0.0 0.0 - 0.2 /100 WBC         Medical History:  Past Medical History:   Diagnosis Date   • HBP (high blood pressure)    • Prostate disorder         Social History:  Social History     Socioeconomic History   • Marital  status: Single     Spouse name: Not on file   • Number of children: Not on file   • Years of education: Not on file   • Highest education level: Not on file   Tobacco Use   • Smoking status: Former Smoker     Packs/day: 0.50     Start date: 1998   • Smokeless tobacco: Never Used   Vaping Use   • Vaping Use: Never used   Substance and Sexual Activity   • Alcohol use: Never   • Drug use: Never        Family History:  Family History   Problem Relation Age of Onset   • Diabetes Father    • Nephrolithiasis Father         Surgical History:  Past Surgical History:   Procedure Laterality Date   • RENAL ARTERY STENT          Allergies:  Allergies   Allergen Reactions   • Penicillins Hives        Current Medications:  No current outpatient medications on file.     No current facility-administered medications for this visit.       Review of systems  Constitutional: Denies fever chills  GI: Denies nausea, vomiting    Objective     Vital Signs:   There were no vitals taken for this visit.      Physical exam  No acute distress, well-nourished  Lungs: Clear, unlabored  CV: Regular rate, no chest retractions  Awake alert and oriented  Mood normal; affect normal    Problem List:  Patient Active Problem List   Diagnosis   • Anxiety   • Benign essential hypertension   • Prostate disorder   • Seasonal allergic rhinitis   • Neurogenic bladder   • CKD (chronic kidney disease)   • Hydronephrosis       Assessment/Plan   Diagnoses and all orders for this visit:    1. Neurogenic bladder (Primary)  -     Basic Metabolic Panel; Future    2. Hydronephrosis, unspecified hydronephrosis type  -     Basic Metabolic Panel; Future    3. Chronic kidney disease, unspecified CKD stage  -     Basic Metabolic Panel; Future        Lab reviewed-  Renal function stable, somewhat improved creatinine to 1.88 from 2.1 from 3.05 from 3.3 from 4.28; discussed the importance of continued self cath to maintain preservation of his upper tracts.    Discussed again  that uncertain whether he will eventually have recovery of bladder function or will warrant prolonged decompression.     As patient remains self-pay, without insurance, and not bothered by self cath; does not wish to pursue further work-up via renal ultrasound, other imaging, or other testing to evaluate for other management options.    Discussed the importance of continued surveillance of at least his creatinine via BMP on an interval basis to ensure stability of his renal function.  Discussed also I would recommend some sort of upper tract imaging at least annually.  He is agreeable with this.      Discussed again that given the initial dilation of  collecting system bilaterally and he presented, believe that he may always have some mild persistent hydronephrosis; could consider MAG3 Lasix scan at some point to rule out obstructive uropathy should his renal function worsen.    Encouraged continued follow-up with nephrology at some point    Continue intermittent cath per routine  Follow-up 6 months with BMP prior  All question addressed       Signed:  Emily Love MD  07/19/21  16:24 EDT     Alert-The patient is alert, awake and responds to voice. The patient is oriented to time, place, and person. The triage nurse is able to obtain subjective information.